# Patient Record
Sex: MALE | Race: WHITE | NOT HISPANIC OR LATINO | URBAN - METROPOLITAN AREA
[De-identification: names, ages, dates, MRNs, and addresses within clinical notes are randomized per-mention and may not be internally consistent; named-entity substitution may affect disease eponyms.]

---

## 2020-02-03 ENCOUNTER — INPATIENT (INPATIENT)
Facility: HOSPITAL | Age: 70
LOS: 1 days | Discharge: HOME | DRG: 872 | End: 2020-02-05
Admitting: INTERNAL MEDICINE
Payer: MEDICARE

## 2020-02-03 VITALS
HEART RATE: 90 BPM | WEIGHT: 160.06 LBS | HEIGHT: 67 IN | TEMPERATURE: 101 F | RESPIRATION RATE: 18 BRPM | SYSTOLIC BLOOD PRESSURE: 133 MMHG | DIASTOLIC BLOOD PRESSURE: 84 MMHG | OXYGEN SATURATION: 98 %

## 2020-02-03 DIAGNOSIS — M54.9 DORSALGIA, UNSPECIFIED: ICD-10-CM

## 2020-02-03 DIAGNOSIS — Z29.9 ENCOUNTER FOR PROPHYLACTIC MEASURES, UNSPECIFIED: ICD-10-CM

## 2020-02-03 DIAGNOSIS — D72.820 LYMPHOCYTOSIS (SYMPTOMATIC): ICD-10-CM

## 2020-02-03 DIAGNOSIS — I10 ESSENTIAL (PRIMARY) HYPERTENSION: ICD-10-CM

## 2020-02-03 DIAGNOSIS — A41.9 SEPSIS, UNSPECIFIED ORGANISM: ICD-10-CM

## 2020-02-03 DIAGNOSIS — C44.92 SQUAMOUS CELL CARCINOMA OF SKIN, UNSPECIFIED: ICD-10-CM

## 2020-02-03 DIAGNOSIS — G89.29 OTHER CHRONIC PAIN: ICD-10-CM

## 2020-02-03 DIAGNOSIS — E86.0 DEHYDRATION: ICD-10-CM

## 2020-02-03 DIAGNOSIS — N12 TUBULO-INTERSTITIAL NEPHRITIS, NOT SPECIFIED AS ACUTE OR CHRONIC: ICD-10-CM

## 2020-02-03 DIAGNOSIS — Z88.0 ALLERGY STATUS TO PENICILLIN: ICD-10-CM

## 2020-02-03 DIAGNOSIS — A41.51 SEPSIS DUE TO ESCHERICHIA COLI [E. COLI]: ICD-10-CM

## 2020-02-03 DIAGNOSIS — Z91.89 OTHER SPECIFIED PERSONAL RISK FACTORS, NOT ELSEWHERE CLASSIFIED: ICD-10-CM

## 2020-02-03 DIAGNOSIS — Z87.891 PERSONAL HISTORY OF NICOTINE DEPENDENCE: ICD-10-CM

## 2020-02-03 DIAGNOSIS — N28.89 OTHER SPECIFIED DISORDERS OF KIDNEY AND URETER: ICD-10-CM

## 2020-02-03 DIAGNOSIS — N17.9 ACUTE KIDNEY FAILURE, UNSPECIFIED: ICD-10-CM

## 2020-02-03 LAB
ALBUMIN SERPL ELPH-MCNC: 3.9 G/DL — SIGNIFICANT CHANGE UP (ref 3.3–5)
ALP SERPL-CCNC: 77 U/L — SIGNIFICANT CHANGE UP (ref 40–120)
ALT FLD-CCNC: 46 U/L — HIGH (ref 10–45)
ANION GAP SERPL CALC-SCNC: 15 MMOL/L — SIGNIFICANT CHANGE UP (ref 5–17)
APPEARANCE UR: CLEAR — SIGNIFICANT CHANGE UP
AST SERPL-CCNC: 27 U/L — SIGNIFICANT CHANGE UP (ref 10–40)
BASOPHILS # BLD AUTO: 0 K/UL — SIGNIFICANT CHANGE UP (ref 0–0.2)
BASOPHILS NFR BLD AUTO: 0 % — SIGNIFICANT CHANGE UP (ref 0–2)
BILIRUB SERPL-MCNC: 0.9 MG/DL — SIGNIFICANT CHANGE UP (ref 0.2–1.2)
BILIRUB UR-MCNC: NEGATIVE — SIGNIFICANT CHANGE UP
BUN SERPL-MCNC: 15 MG/DL — SIGNIFICANT CHANGE UP (ref 7–23)
CALCIUM SERPL-MCNC: 9.4 MG/DL — SIGNIFICANT CHANGE UP (ref 8.4–10.5)
CHLORIDE SERPL-SCNC: 97 MMOL/L — SIGNIFICANT CHANGE UP (ref 96–108)
CO2 SERPL-SCNC: 26 MMOL/L — SIGNIFICANT CHANGE UP (ref 22–31)
COLOR SPEC: YELLOW — SIGNIFICANT CHANGE UP
CREAT SERPL-MCNC: 1.28 MG/DL — SIGNIFICANT CHANGE UP (ref 0.5–1.3)
DIFF PNL FLD: ABNORMAL
EOSINOPHIL # BLD AUTO: 0 K/UL — SIGNIFICANT CHANGE UP (ref 0–0.5)
EOSINOPHIL NFR BLD AUTO: 0 % — SIGNIFICANT CHANGE UP (ref 0–6)
FLU A RESULT: SIGNIFICANT CHANGE UP
FLU A RESULT: SIGNIFICANT CHANGE UP
FLUAV AG NPH QL: SIGNIFICANT CHANGE UP
FLUBV AG NPH QL: SIGNIFICANT CHANGE UP
GLUCOSE BLDC GLUCOMTR-MCNC: 109 MG/DL — HIGH (ref 70–99)
GLUCOSE SERPL-MCNC: 144 MG/DL — HIGH (ref 70–99)
GLUCOSE UR QL: NEGATIVE — SIGNIFICANT CHANGE UP
HCT VFR BLD CALC: 45.3 % — SIGNIFICANT CHANGE UP (ref 39–50)
HGB BLD-MCNC: 15 G/DL — SIGNIFICANT CHANGE UP (ref 13–17)
KETONES UR-MCNC: NEGATIVE — SIGNIFICANT CHANGE UP
LACTATE SERPL-SCNC: 1.5 MMOL/L — SIGNIFICANT CHANGE UP (ref 0.5–2)
LEUKOCYTE ESTERASE UR-ACNC: ABNORMAL
LYMPHOCYTES # BLD AUTO: 1.58 K/UL — SIGNIFICANT CHANGE UP (ref 1–3.3)
LYMPHOCYTES # BLD AUTO: 6.1 % — LOW (ref 13–44)
MCHC RBC-ENTMCNC: 29.1 PG — SIGNIFICANT CHANGE UP (ref 27–34)
MCHC RBC-ENTMCNC: 33.1 GM/DL — SIGNIFICANT CHANGE UP (ref 32–36)
MCV RBC AUTO: 88 FL — SIGNIFICANT CHANGE UP (ref 80–100)
MONOCYTES # BLD AUTO: 0.9 K/UL — SIGNIFICANT CHANGE UP (ref 0–0.9)
MONOCYTES NFR BLD AUTO: 3.5 % — SIGNIFICANT CHANGE UP (ref 2–14)
NEUTROPHILS # BLD AUTO: 22.92 K/UL — HIGH (ref 1.8–7.4)
NEUTROPHILS NFR BLD AUTO: 88.7 % — HIGH (ref 43–77)
NITRITE UR-MCNC: POSITIVE
PH UR: 7 — SIGNIFICANT CHANGE UP (ref 5–8)
PLATELET # BLD AUTO: 349 K/UL — SIGNIFICANT CHANGE UP (ref 150–400)
POTASSIUM SERPL-MCNC: 4.5 MMOL/L — SIGNIFICANT CHANGE UP (ref 3.5–5.3)
POTASSIUM SERPL-SCNC: 4.5 MMOL/L — SIGNIFICANT CHANGE UP (ref 3.5–5.3)
PROT SERPL-MCNC: 7.3 G/DL — SIGNIFICANT CHANGE UP (ref 6–8.3)
PROT UR-MCNC: 30 MG/DL
RBC # BLD: 5.15 M/UL — SIGNIFICANT CHANGE UP (ref 4.2–5.8)
RBC # FLD: 13.8 % — SIGNIFICANT CHANGE UP (ref 10.3–14.5)
RSV RESULT: SIGNIFICANT CHANGE UP
RSV RNA RESP QL NAA+PROBE: SIGNIFICANT CHANGE UP
SODIUM SERPL-SCNC: 138 MMOL/L — SIGNIFICANT CHANGE UP (ref 135–145)
SP GR SPEC: 1.01 — SIGNIFICANT CHANGE UP (ref 1–1.03)
UROBILINOGEN FLD QL: 0.2 E.U./DL — SIGNIFICANT CHANGE UP
WBC # BLD: 25.84 K/UL — HIGH (ref 3.8–10.5)
WBC # FLD AUTO: 25.84 K/UL — HIGH (ref 3.8–10.5)

## 2020-02-03 PROCEDURE — 71046 X-RAY EXAM CHEST 2 VIEWS: CPT | Mod: 26

## 2020-02-03 PROCEDURE — 99223 1ST HOSP IP/OBS HIGH 75: CPT | Mod: GC

## 2020-02-03 PROCEDURE — 70450 CT HEAD/BRAIN W/O DYE: CPT | Mod: 26

## 2020-02-03 PROCEDURE — 71260 CT THORAX DX C+: CPT | Mod: 26

## 2020-02-03 PROCEDURE — 99291 CRITICAL CARE FIRST HOUR: CPT

## 2020-02-03 PROCEDURE — 74177 CT ABD & PELVIS W/CONTRAST: CPT | Mod: 26

## 2020-02-03 RX ORDER — SODIUM CHLORIDE 9 MG/ML
1000 INJECTION, SOLUTION INTRAVENOUS
Refills: 0 | Status: DISCONTINUED | OUTPATIENT
Start: 2020-02-03 | End: 2020-02-05

## 2020-02-03 RX ORDER — SODIUM CHLORIDE 9 MG/ML
1000 INJECTION INTRAMUSCULAR; INTRAVENOUS; SUBCUTANEOUS ONCE
Refills: 0 | Status: COMPLETED | OUTPATIENT
Start: 2020-02-03 | End: 2020-02-03

## 2020-02-03 RX ORDER — SODIUM CHLORIDE 9 MG/ML
200 INJECTION INTRAMUSCULAR; INTRAVENOUS; SUBCUTANEOUS ONCE
Refills: 0 | Status: COMPLETED | OUTPATIENT
Start: 2020-02-03 | End: 2020-02-03

## 2020-02-03 RX ORDER — ACETAMINOPHEN 500 MG
650 TABLET ORAL EVERY 4 HOURS
Refills: 0 | Status: DISCONTINUED | OUTPATIENT
Start: 2020-02-03 | End: 2020-02-03

## 2020-02-03 RX ORDER — DEXTROSE 50 % IN WATER 50 %
25 SYRINGE (ML) INTRAVENOUS ONCE
Refills: 0 | Status: DISCONTINUED | OUTPATIENT
Start: 2020-02-03 | End: 2020-02-05

## 2020-02-03 RX ORDER — ACETAMINOPHEN 500 MG
650 TABLET ORAL EVERY 4 HOURS
Refills: 0 | Status: DISCONTINUED | OUTPATIENT
Start: 2020-02-03 | End: 2020-02-05

## 2020-02-03 RX ORDER — VANCOMYCIN HCL 1 G
1000 VIAL (EA) INTRAVENOUS ONCE
Refills: 0 | Status: COMPLETED | OUTPATIENT
Start: 2020-02-03 | End: 2020-02-03

## 2020-02-03 RX ORDER — CEFTRIAXONE 500 MG/1
2000 INJECTION, POWDER, FOR SOLUTION INTRAMUSCULAR; INTRAVENOUS EVERY 24 HOURS
Refills: 0 | Status: DISCONTINUED | OUTPATIENT
Start: 2020-02-03 | End: 2020-02-05

## 2020-02-03 RX ORDER — CEFEPIME 1 G/1
2000 INJECTION, POWDER, FOR SOLUTION INTRAMUSCULAR; INTRAVENOUS ONCE
Refills: 0 | Status: COMPLETED | OUTPATIENT
Start: 2020-02-03 | End: 2020-02-03

## 2020-02-03 RX ORDER — DEXTROSE 50 % IN WATER 50 %
15 SYRINGE (ML) INTRAVENOUS ONCE
Refills: 0 | Status: DISCONTINUED | OUTPATIENT
Start: 2020-02-03 | End: 2020-02-05

## 2020-02-03 RX ORDER — INSULIN LISPRO 100/ML
VIAL (ML) SUBCUTANEOUS
Refills: 0 | Status: DISCONTINUED | OUTPATIENT
Start: 2020-02-03 | End: 2020-02-05

## 2020-02-03 RX ORDER — KETOROLAC TROMETHAMINE 30 MG/ML
15 SYRINGE (ML) INJECTION ONCE
Refills: 0 | Status: DISCONTINUED | OUTPATIENT
Start: 2020-02-03 | End: 2020-02-03

## 2020-02-03 RX ORDER — DEXTROSE 50 % IN WATER 50 %
12.5 SYRINGE (ML) INTRAVENOUS ONCE
Refills: 0 | Status: DISCONTINUED | OUTPATIENT
Start: 2020-02-03 | End: 2020-02-05

## 2020-02-03 RX ORDER — ACETAMINOPHEN 500 MG
1000 TABLET ORAL ONCE
Refills: 0 | Status: COMPLETED | OUTPATIENT
Start: 2020-02-03 | End: 2020-02-03

## 2020-02-03 RX ORDER — ENOXAPARIN SODIUM 100 MG/ML
40 INJECTION SUBCUTANEOUS EVERY 24 HOURS
Refills: 0 | Status: DISCONTINUED | OUTPATIENT
Start: 2020-02-03 | End: 2020-02-05

## 2020-02-03 RX ORDER — CARVEDILOL PHOSPHATE 80 MG/1
1 CAPSULE, EXTENDED RELEASE ORAL
Qty: 0 | Refills: 0 | DISCHARGE

## 2020-02-03 RX ORDER — NIFEDIPINE 30 MG
20 TABLET, EXTENDED RELEASE 24 HR ORAL
Qty: 0 | Refills: 0 | DISCHARGE

## 2020-02-03 RX ORDER — GLUCAGON INJECTION, SOLUTION 0.5 MG/.1ML
1 INJECTION, SOLUTION SUBCUTANEOUS ONCE
Refills: 0 | Status: DISCONTINUED | OUTPATIENT
Start: 2020-02-03 | End: 2020-02-05

## 2020-02-03 RX ORDER — ACETAMINOPHEN 500 MG
975 TABLET ORAL ONCE
Refills: 0 | Status: COMPLETED | OUTPATIENT
Start: 2020-02-03 | End: 2020-02-03

## 2020-02-03 RX ORDER — SODIUM CHLORIDE 9 MG/ML
1000 INJECTION INTRAMUSCULAR; INTRAVENOUS; SUBCUTANEOUS
Refills: 0 | Status: DISCONTINUED | OUTPATIENT
Start: 2020-02-03 | End: 2020-02-04

## 2020-02-03 RX ORDER — ONDANSETRON 8 MG/1
4 TABLET, FILM COATED ORAL ONCE
Refills: 0 | Status: COMPLETED | OUTPATIENT
Start: 2020-02-03 | End: 2020-02-03

## 2020-02-03 RX ADMIN — Medication 250 MILLIGRAM(S): at 15:01

## 2020-02-03 RX ADMIN — Medication 975 MILLIGRAM(S): at 14:09

## 2020-02-03 RX ADMIN — CEFEPIME 100 MILLIGRAM(S): 1 INJECTION, POWDER, FOR SOLUTION INTRAMUSCULAR; INTRAVENOUS at 14:45

## 2020-02-03 RX ADMIN — Medication 15 MILLIGRAM(S): at 16:22

## 2020-02-03 RX ADMIN — Medication 400 MILLIGRAM(S): at 14:45

## 2020-02-03 RX ADMIN — SODIUM CHLORIDE 1000 MILLILITER(S): 9 INJECTION INTRAMUSCULAR; INTRAVENOUS; SUBCUTANEOUS at 14:45

## 2020-02-03 RX ADMIN — Medication 975 MILLIGRAM(S): at 14:50

## 2020-02-03 RX ADMIN — Medication 1000 MILLIGRAM(S): at 15:08

## 2020-02-03 RX ADMIN — CEFEPIME 2000 MILLIGRAM(S): 1 INJECTION, POWDER, FOR SOLUTION INTRAMUSCULAR; INTRAVENOUS at 15:08

## 2020-02-03 RX ADMIN — SODIUM CHLORIDE 100 MILLILITER(S): 9 INJECTION INTRAMUSCULAR; INTRAVENOUS; SUBCUTANEOUS at 20:04

## 2020-02-03 RX ADMIN — SODIUM CHLORIDE 1000 MILLILITER(S): 9 INJECTION INTRAMUSCULAR; INTRAVENOUS; SUBCUTANEOUS at 14:09

## 2020-02-03 RX ADMIN — ENOXAPARIN SODIUM 40 MILLIGRAM(S): 100 INJECTION SUBCUTANEOUS at 21:58

## 2020-02-03 RX ADMIN — Medication 1000 MILLIGRAM(S): at 15:00

## 2020-02-03 RX ADMIN — SODIUM CHLORIDE 400 MILLILITER(S): 9 INJECTION INTRAMUSCULAR; INTRAVENOUS; SUBCUTANEOUS at 15:01

## 2020-02-03 RX ADMIN — CEFTRIAXONE 100 MILLIGRAM(S): 500 INJECTION, POWDER, FOR SOLUTION INTRAMUSCULAR; INTRAVENOUS at 19:53

## 2020-02-03 RX ADMIN — Medication 15 MILLIGRAM(S): at 15:37

## 2020-02-03 NOTE — H&P ADULT - PROBLEM SELECTOR PLAN 1
-PCP Contacted on Admission: (Y/N) -->  Dr. Gelacio Melgar  -Date of Contact with PCP:  -PCP Contacted at Discharge: (Y/N, N/A)  -Summary of Handoff Given to PCP:   -Post-Discharge Appointment Date and Location: Pt Septic at presentation (89 F, tachy, wbc, u/a +) 2/2 pyelonephritis 2/2 UTI. Pt p/w chronic back pain + fevers (103.5 F at home) for weeks. 89.F rectal in ED, leukocytosis 25.84, WBC 25.84, elevated neutrophils, U/A=+protein, +nitrite, +leuk esterase, large blood, >10 wbc. CT abdomen showed right ureteritis/UTI concerning for acute pyelonephritis (no stones or obstruction seen). Currently normal creatinine. S/p 2.2 L .9NS. Currently afebrile w/ VSS, perfusing well to organs + mentating well  -c/w 2g ceftriaxone  -started maintenance fluids 100cc/hr .9NS  -ordered US retroperitoneal   -urology consulted + following, defer ureteral stent/PCN at this time as pt improving w/ VSS  - Follow up BCx  - Follow up UCx Pt Septic at presentation (89 F, tachy, wbc, u/a +) 2/2 pyelonephritis 2/2 UTI. Pt p/w chronic back pain + fevers (103.5 F at home) for weeks. 106.F rectal in ED, leukocytosis 25.84, elevated neutrophils, U/A=+protein, +nitrite, +leuk esterase, large blood, >10 wbc. CT abdomen showed right ureteritis/UTI concerning for acute pyelonephritis (no stones or obstruction seen). Currently normal creatinine. S/p 2.2 L .9NS. Currently afebrile w/ VSS, perfusing well to organs + mentating well  -c/w 2g ceftriaxone  -started maintenance fluids 100cc/hr .9NS  -ordered US retroperitoneal   -urology consulted + following, defer ureteral stent/PCN at this time as pt improving w/ VSS  - Follow up BCx  - Follow up UCx

## 2020-02-03 NOTE — CONSULT NOTE ADULT - SUBJECTIVE AND OBJECTIVE BOX
69M hx chronic back pain, former smoker, no significant  hx including stones, hematuria, dysuria presented to ER today with malaise, mental status changes and was found to have fever of 106 with CT findings c/w right pyelonephritis and ureterities with mild hydronephrosis. A week PTA he notes a viral illness which resolved last week but over the weekend began having malaise until this morning when he felt unwell and was febrile so he proceeded to the ER. He has noted R flank/back pain for the last few weeks but never severe and not outside his usual back pain. He denies any hx of nephrolithiasis or passing any stones, hematuria or dysuria. Former smoker in his teenage years and intermittent cigars currently. Denies dysuria but had noted some urinary urgency over the last few days.    HEALTH ISSUES - PROBLEM Dx:        PAST MEDICAL & SURGICAL HISTORY:  Chronic back pain  HTN (hypertension)    Allergies    penicillin (Other)    Intolerances      MEDICATIONS  (STANDING):  cefTRIAXone   IVPB 2000 milliGRAM(s) IV Intermittent every 24 hours  enoxaparin Injectable 40 milliGRAM(s) SubCutaneous every 24 hours    MEDICATIONS  (PRN):  acetaminophen   Tablet .. 650 milliGRAM(s) Oral every 4 hours PRN Mild Pain (1 - 3)    FAMILY HISTORY:    ICU Vital Signs Last 24 Hrs  T(C): 36.9 (2020 18:10), Max: 41.2 (2020 14:46)  T(F): 98.4 (2020 18:10), Max: 106.2 (2020 14:46)  HR: 70 (2020 18:10) (70 - 104)  BP: 118/76 (2020 18:10) (111/74 - 135/92)  BP(mean): --  ABP: --  ABP(mean): --  RR: 18 (2020 18:10) (18 - 18)  SpO2: 94% (2020 18:10) (94% - 100%)      PE:   Appears unwell, NAD, AAOx3, warm, and clamy  S/NT/ND, (-)CVAT b/l  : normal phallus, uncircumcised, prostate 50gm, no nodules, no induration  urine clear yellow, 16fr son catheter inserted without issue                          15.0   25.84 )-----------( 349      ( 2020 14:10 )             45.3     02-    138  |  97  |  15  ----------------------------<  144<H>  4.5   |  26  |  1.28    Ca    9.4      2020 14:10    TPro  7.3  /  Alb  3.9  /  TBili  0.9  /  DBili  x   /  AST  27  /  ALT  46<H>  /  AlkPhos  77  02-03      Urinalysis Basic - ( 2020 15:23 )    Color: Yellow / Appearance: Clear / S.015 / pH: x  Gluc: x / Ketone: NEGATIVE  / Bili: Negative / Urobili: 0.2 E.U./dL   Blood: x / Protein: 30 mg/dL / Nitrite: POSITIVE   Leuk Esterase: Small / RBC: > 10 /HPF / WBC > 10 /HPF   Sq Epi: x / Non Sq Epi: 0-5 /HPF / Bacteria: Present /HPF    < from: CT Abdomen and Pelvis w/ IV Cont (20 @ 16:07) >  Impression:   1.  Negative for consolidation of the lung.  2.  No evidence of bowel obstruction or free air.  3.  Findings in reference to the right ureter suggests acute ureteritis/UTI. Findings in reference to the right kidney suggest acute pyelonephritis. Correlate with urine culture and microscopy.    < end of copied text >

## 2020-02-03 NOTE — CONSULT NOTE ADULT - SUBJECTIVE AND OBJECTIVE BOX
Patient is a 69y old  Male who presents with a chief complaint of Pyelonephritis (2020 19:47)      HPI:  incomplete (2020 19:47)      PAST MEDICAL & SURGICAL HISTORY:  Chronic back pain  HTN (hypertension)      FAMILY HISTORY:      SOCIAL HISTORY:  Smoking Status: [ ] Current, [ ] Former, [ ] Never  Pack Years:    MEDICATIONS:  Pulmonary:    Antimicrobials:  cefTRIAXone   IVPB 2000 milliGRAM(s) IV Intermittent every 24 hours    Anticoagulants:  enoxaparin Injectable 40 milliGRAM(s) SubCutaneous every 24 hours    Onc:    GI/:    Endocrine:  dextrose 40% Gel 15 Gram(s) Oral once PRN  dextrose 50% Injectable 12.5 Gram(s) IV Push once  dextrose 50% Injectable 25 Gram(s) IV Push once  dextrose 50% Injectable 25 Gram(s) IV Push once  glucagon  Injectable 1 milliGRAM(s) IntraMuscular once PRN  insulin lispro (HumaLOG) corrective regimen sliding scale   SubCutaneous Before meals and at bedtime    Cardiac:    Other Medications:  acetaminophen   Tablet .. 650 milliGRAM(s) Oral every 4 hours PRN  dextrose 5%. 1000 milliLiter(s) IV Continuous <Continuous>  sodium chloride 0.9%. 1000 milliLiter(s) IV Continuous <Continuous>      Allergies    penicillin (Other)    Intolerances        Review of Systems:   •	General: fevre weak  •	Skin/Breast: negative  •	Ophthalmologic: negative  •	ENMT: negative  •	Respiratory and Thorax: negative  •	Cardiovascular: negative  •	Gastrointestinal: negative  •	Genitourinary: negative  •	Musculoskeletal: negative  •	Neurological: negative  •	Psychiatric: negative  •	Hematology/Lymphatics: negative  •	Endocrine: negative  •	Allergic/Immunologic: negative    Physical Exam:   • Constitutional:	Well-developed, well nourished  • Eyes:	EOMI; PERRL; no drainage or redness  • ENMT:	No oral lesions; no gross abnormalities  • Neck	No bruits; no thyromegaly or nodules  • Breasts:	not examined  • Back:	No deformity or limitation of movement  • Respiratory:	Breath Sounds equal & clear to percussion & auscultation, no accessory muscle use  • Cardiovascular:	Regular rate & rhythm, normal S1, S2; no murmurs, gallops or rubs; no S3, S4  • Gastrointestinal:	Soft, non-tender, no hepatosplenomegaly, normal bowel sounds  • Genitourinary:	not examined  • Rectal: not examined  • Extremities:	No cyanosis, clubbing or edema  • Vascular:	Equal and normal pulses (carotid, femoral, dorsalis pedis)  • Neurologica:l	not examined  • Skin:	No lesions; no rash  • Lymph Nodes:	No lymphadedenopathy  • Musculoskeletal:	No joint pain, swelling or deformity; no limitation of movement      Vital Signs Last 24 Hrs  T(C): 36.9 (2020 18:10), Max: 41.2 (2020 14:46)  T(F): 98.4 (2020 18:10), Max: 106.2 (2020 14:46)  HR: 70 (2020 18:10) (70 - 104)  BP: 118/76 (2020 18:10) (111/74 - 135/92)  BP(mean): --  RR: 18 (2020 18:10) (18 - 18)  SpO2: 94% (2020 18:10) (94% - 100%)     @ 07:01  -  - @ 21:40  --------------------------------------------------------  IN: 0 mL / OUT: 300 mL / NET: -300 mL          LABS:      CBC Full  -  ( 2020 14:10 )  WBC Count : 25.84 K/uL  RBC Count : 5.15 M/uL  Hemoglobin : 15.0 g/dL  Hematocrit : 45.3 %  Platelet Count - Automated : 349 K/uL  Mean Cell Volume : 88.0 fl  Mean Cell Hemoglobin : 29.1 pg  Mean Cell Hemoglobin Concentration : 33.1 gm/dL  Auto Neutrophil # : 22.92 K/uL  Auto Lymphocyte # : 1.58 K/uL  Auto Monocyte # : 0.90 K/uL  Auto Eosinophil # : 0.00 K/uL  Auto Basophil # : 0.00 K/uL  Auto Neutrophil % : 88.7 %  Auto Lymphocyte % : 6.1 %  Auto Monocyte % : 3.5 %  Auto Eosinophil % : 0.0 %  Auto Basophil % : 0.0 %        138  |  97  |  15  ----------------------------<  144<H>  4.5   |  26  |  1.28    Ca    9.4      2020 14:10    TPro  7.3  /  Alb  3.9  /  TBili  0.9  /  DBili  x   /  AST  27  /  ALT  46<H>  /  AlkPhos  77  -          Urinalysis Basic - ( 2020 15:23 )    Color: Yellow / Appearance: Clear / S.015 / pH: x  Gluc: x / Ketone: NEGATIVE  / Bili: Negative / Urobili: 0.2 E.U./dL   Blood: x / Protein: 30 mg/dL / Nitrite: POSITIVE   Leuk Esterase: Small / RBC: > 10 /HPF / WBC > 10 /HPF   Sq Epi: x / Non Sq Epi: 0-5 /HPF / Bacteria: Present /HPF                RADIOLOGY & ADDITIONAL STUDIES (The following images were personally reviewed):    < from: CT Chest w/ IV Cont (20 @ 16:07) >    EXAM:  CT CHEST IC                          EXAM:  CT ABDOMEN AND PELVIS IC                          PROCEDURE DATE:  2020          INTERPRETATION:  CT SCAN OF CHEST, ABDOMEN AND PELVIS    History: Fever, vomiting, leukocytosis. Rule out pneumonia.    Technique: CT scan of chest, abdomen and pelvis performed from lung apices through pubic symphysis. Intravenous contrast was administered. No oral contrast was given as per ordering physician. Axial, coronal, and sagittal multiplanar reformatted images were produced. Thin section axial images and axial MIPS of the chest were also produced.     Comparison: None.    Findings:     Lungs and large airways: Minimal bibasilar atelectasis bilaterally. Subtle groundglass nodular opacities in theleft upper lobe.    Pleura:  No pleural effusion.    Mediastinum and hilar regions: No thoracic lymphadenopathy.    Heart and pericardium:  Heart size is normal. No pericardial effusion.    Vessels:  There is scant calcified plaque of the cortical iliac aortoiliac system.    Chest wall and lower neck:  Normal.    Liver:  Normal.    Gallbladder: No radiopaque stones gallbladder.    Spleen:  Mildly enlarged.    Pancreas:  Normal.    Adrenal glands:  Normal.    Kidneys: 1.3 cm hypoattenuating lesionof the interpolar region of the left kidney, consistent with a cyst. Subtle cortical hypodensities in the right kidney. The right ureter is mildly dilated and shows wall thickening and periureteric stranding. No ureteric calculus. Right perinephric fat infiltration. This appears to be slight delay in excretion by the right kidney as compared to the left.    Abdominal and pelvic adenopathy:  No lymphadenopathy in abdomen or pelvis.    Ascites: None.    Gastrointestinal tract: Evaluation limited, secondary to lack of oral contrast. There is a radiopaque foreign body present within the rectum, likely representing external temperature probe Normal appendix. No evidence of obstruction or free air. Normal appendix.    Pelvic organs: There is mild prostatomegaly, with a calculated volume of 57 cc. The prostate measures 5.5 x 4.3 x 4.5 cm. The urinary bladder appears unremarkable.    Soft tissues: There is a tiny fat-containing umbilical hernia.    Bones: Osteophyte formation present at the endplate of T9-T10.      Impression:   1.  Negative for consolidation of the lung.  2.  No evidence of bowel obstruction or free air.  3.  Findings in reference to the right ureter suggests acute ureteritis/UTI. Findings in reference to the right kidney suggest acute pyelonephritis. Correlate with urine culture and microscopy.    < end of copied text >

## 2020-02-03 NOTE — H&P ADULT - NSHPPHYSICALEXAM_GEN_ALL_CORE
T(F): 98.4 (02-03-20 @ 18:10)  HR: 66 (02-03-20 @ 23:15)  BP: 136/81 (02-03-20 @ 23:15)  RR: 17 (02-03-20 @ 23:15)  SpO2: 98% (02-03-20 @ 23:15)    GENERAL: NAD, breathing comfortably on room air  HEAD:  Atraumatic, Normocephalic  EYES: EOMI, PERRLA, conjunctiva and sclera clear  ENT: dry mucous membranes  NECK: Supple   CHEST/LUNG: Clear to auscultation bilaterally; No rales, rhonchi, wheezing, or rubs. Unlabored respirations  HEART: Regular rate and rhythm; No murmurs, rubs, or gallops  ABDOMEN: Bowel sounds present; Soft, Nontender, Nondistended   EXTREMITIES:  2+ Peripheral radial + DP. No leg edema, warm hands + feet  NERVOUS SYSTEM:  Alert & Oriented X3, speech clear. No deficits   MSK: FROM all 4 extremities, full and equal strength. Positive CVA  tenderness in Right lower back  SKIN: No rashes or lesions

## 2020-02-03 NOTE — H&P ADULT - NSHPREVIEWOFSYSTEMS_GEN_ALL_CORE
REVIEW OF SYSTEMS:    CONSTITUTIONAL: No weakness, chills. Admits to fevers  EYES/ENT: No visual changes;  No vertigo or throat pain   NECK: No pain or stiffness  RESPIRATORY: No cough, wheezing, hemoptysis; No shortness of breath  CARDIOVASCULAR: No chest pain or palpitations  GASTROINTESTINAL: No abdominal or epigastric pain. No nausea, vomiting, or hematemesis; No diarrhea or constipation. No melena or hematochezia.  MSK: Admits to chronic back pain  GENITOURINARY: No dysuria or hematuria. Admits to increased urgency/discomfort w/ urination   NEUROLOGICAL: No numbness or weakness  SKIN: No itching, rashes

## 2020-02-03 NOTE — PROGRESS NOTE ADULT - NSHPATTENDINGPLANDISCUSS_GEN_ALL_CORE
ER Physician and PA earlier as well as Critical care attending once results of the CT scans were available, as well the patient and his daughter Nina and his brother  Gelacio Palm this evening. Will follow patient in hospital and later as outpatient.

## 2020-02-03 NOTE — H&P ADULT - PROBLEM SELECTOR PLAN 3
Pt w/ hx of HTN, takes coreg 12.5mg qd and Nifedipine 20mg ER qhs at home. Currently 130's/80's  -hold bp meds in setting of sepsis

## 2020-02-03 NOTE — CONSULT NOTE ADULT - ASSESSMENT
69M hx HTN, no significant  hx, with likely severe pyelonephritis, mild hydronephrosis but no obstructing stone or lesion visible on CT.  -UCx  -Broad spectrum ABx  -Renal/Bladder US  -Son  -It is early in his clinical course but currently is afebrile with VSS. Would defer ureteral stent/PCN at this time given no significant hydronephrosis and clinically improving, will consider if clinically deteriorating  -Will follow closely with you

## 2020-02-03 NOTE — H&P ADULT - ASSESSMENT
68 yo M PMH HTN, prostatitis, chronic back pain (2/2 MSK issues), chronic lymphocytosis, skin SCC w/ resection/biopsy p/w fever + urinary discomfort for the last 3 day, found to have sepsis (89 F, tachy, wbc, u/a +) 2/2 pyelonephritis (CT abdomen showed right ureteritis/UTI concerning for acute pyelonephritis (no stones or obstruction seen)) 2/2 UTI (U/A=+protein, +nitrite, +leuk esterase, large blood, >10 wbc)

## 2020-02-03 NOTE — CONSULT NOTE ADULT - ATTENDING COMMENTS
Patient seen and examined with house-staff during bedside rounds.  Resident note read, including vitals, physical findings, laboratory data, and radiological reports.   Revisions included below.  Direct personal management at bed side and extensive interpretation of the data.  Plan was outlined and discussed in details with the housestaff.  Decision making of high complexity  Action taken for acute disease activity to reflect the level of care provided:  - medication reconciliation  - review laboratory data  PMD and urology

## 2020-02-03 NOTE — CONSULT NOTE ADULT - SUBJECTIVE AND OBJECTIVE BOX
CONSULT NOTE:     This is a 69 y.o male w/ HTN who presented from home after having a fever of 103. Per patient, 2 weeks ago, he had a dry cough for which he took a Z pack without improvement. He prescribed himself medrol dose pack ( last dose Sat) after which his cough resolved. Since , he has been feeling extremely weak. He denies any back pain, abdominal pain, N/V/D/C but ROS positive for urinary urgency, fevers and chills. Per family at bedside, he had a brief episode of altered mental status at the time of fever that resolved.   On ED arrival, his VS were notable for rectal Temp of 106, 's. He was given 2.2 L NS, vancomycin and cefepime.   His labs were notable for WC 25k with neutrophil predominance, positive UA, CXR with no consolidation. RVP negative.   CT abdomen and pelvis showed findings suggestive of R  ureteritis and pyelonephritis.     VITAL SIGNS:  Vital Signs Last 24 Hrs  T(C): 36.8 (2020 17:41), Max: 41.2 (2020 14:46)  T(F): 98.2 (2020 17:41), Max: 106.2 (2020 14:46)  HR: 96 (2020 17:41) (90 - 104)  BP: 111/74 (2020 17:41) (111/74 - 135/92)  BP(mean): --  RR: 18 (2020 17:41) (18 - 18)  SpO2: 96% (2020 17:41) (96% - 100%)      PHYSICAL EXAM:    General: WDWN male in NAD  HEENT: NC/AT; PERRL, anicteric sclera; MMM  Neck: supple  Cardiovascular: +S1/S2; RRR, tachycardic   Respiratory: CTA B/L; no W/R/R  Gastrointestinal: soft, NT/ND; +BSx4. No CVA tenderness  Extremities: WWP; no edema, clubbing or cyanosis  Vascular: 2+ radial, DP/PT pulses B/L  Neurological: AAOx3; no focal deficits    MEDICATIONS:  MEDICATIONS  (STANDING):    MEDICATIONS  (PRN):      ALLERGIES:  Allergies    penicillin (Other)    Intolerances        LABS:                        15.0   25.84 )-----------( 349      ( 2020 14:10 )             45.3     02-    138  |  97  |  15  ----------------------------<  144<H>  4.5   |  26  |  1.28    Ca    9.4      2020 14:10    TPro  7.3  /  Alb  3.9  /  TBili  0.9  /  DBili  x   /  AST  27  /  ALT  46<H>  /  AlkPhos  77  02-03      Urinalysis Basic - ( 2020 15:23 )    Color: Yellow / Appearance: Clear / S.015 / pH: x  Gluc: x / Ketone: NEGATIVE  / Bili: Negative / Urobili: 0.2 E.U./dL   Blood: x / Protein: 30 mg/dL / Nitrite: POSITIVE   Leuk Esterase: Small / RBC: > 10 /HPF / WBC > 10 /HPF   Sq Epi: x / Non Sq Epi: 0-5 /HPF / Bacteria: Present /HPF      CAPILLARY BLOOD GLUCOSE            < from: CT Abdomen and Pelvis w/ IV Cont (20 @ 16:07) >  Impression:   1.  Negative for consolidation of the lung.  2.  No evidence of bowel obstruction or free air.  3.  Findings in reference to the right ureter suggests acute ureteritis/UTI. Findings in reference to the right kidney suggest acute pyelonephritis. Correlate with urine culture and microscopy.        < end of copied text > CONSULT NOTE:     This is a 69 y.o male w/ HTN who presented from home after having a fever of 103. Per patient, 2 weeks ago, he had a dry cough for which he took a Z pack without improvement. He prescribed himself medrol dose pack ( last dose Sat) after which his cough resolved. Since , he has been feeling extremely weak. He denies any back pain, abdominal pain, N/V/D/C but ROS positive for urinary urgency, fevers and chills. Per family at bedside, he had a brief episode of altered mental status at the time of fever that resolved.   On ED arrival, his VS were notable for rectal Temp of 106, 's. He was given 2.2 L NS, vancomycin and cefepime.   His labs were notable for WC 25k with neutrophil predominance, positive UA, CXR with no consolidation. RVP negative.   CT abdomen and pelvis showed findings suggestive of R  ureteritis and pyelonephritis.     MEDS:   Nifedipine    Social Hx:   Works as M physician in a private concert     VITAL SIGNS:  Vital Signs Last 24 Hrs  T(C): 36.8 (2020 17:41), Max: 41.2 (2020 14:46)  T(F): 98.2 (2020 17:41), Max: 106.2 (2020 14:46)  HR: 96 (2020 17:41) (90 - 104)  BP: 111/74 (2020 17:41) (111/74 - 135/92)  BP(mean): --  RR: 18 (2020 17:41) (18 - 18)  SpO2: 96% (2020 17:41) (96% - 100%)      PHYSICAL EXAM:    General: WDWN male in NAD  HEENT: NC/AT; PERRL, anicteric sclera; MMM  Neck: supple  Cardiovascular: +S1/S2; RRR, tachycardic   Respiratory: CTA B/L; no W/R/R  Gastrointestinal: soft, NT/ND; +BSx4. No CVA tenderness  Extremities: WWP; no edema, clubbing or cyanosis  Vascular: 2+ radial, DP/PT pulses B/L  Neurological: AAOx3; no focal deficits    MEDICATIONS:  MEDICATIONS  (STANDING):    MEDICATIONS  (PRN):      ALLERGIES:  Allergies    penicillin (Other)    Intolerances        LABS:                        15.0   25.84 )-----------( 349      ( 2020 14:10 )             45.3     02-    138  |  97  |  15  ----------------------------<  144<H>  4.5   |  26  |  1.28    Ca    9.4      2020 14:10    TPro  7.3  /  Alb  3.9  /  TBili  0.9  /  DBili  x   /  AST  27  /  ALT  46<H>  /  AlkPhos  77  02-03      Urinalysis Basic - ( 2020 15:23 )    Color: Yellow / Appearance: Clear / S.015 / pH: x  Gluc: x / Ketone: NEGATIVE  / Bili: Negative / Urobili: 0.2 E.U./dL   Blood: x / Protein: 30 mg/dL / Nitrite: POSITIVE   Leuk Esterase: Small / RBC: > 10 /HPF / WBC > 10 /HPF   Sq Epi: x / Non Sq Epi: 0-5 /HPF / Bacteria: Present /HPF      CAPILLARY BLOOD GLUCOSE            < from: CT Abdomen and Pelvis w/ IV Cont (20 @ 16:07) >  Impression:   1.  Negative for consolidation of the lung.  2.  No evidence of bowel obstruction or free air.  3.  Findings in reference to the right ureter suggests acute ureteritis/UTI. Findings in reference to the right kidney suggest acute pyelonephritis. Correlate with urine culture and microscopy.        < end of copied text >

## 2020-02-03 NOTE — H&P ADULT - NSHPLABSRESULTS_GEN_ALL_CORE
.  LABS:                         15.0   25.84 )-----------( 349      ( 2020 14:10 )             45.3     -    138  |  97  |  15  ----------------------------<  144<H>  4.5   |  26  |  1.28    Ca    9.4      2020 14:10    TPro  7.3  /  Alb  3.9  /  TBili  0.9  /  DBili  x   /  AST  27  /  ALT  46<H>  /  AlkPhos  77  -      Urinalysis Basic - ( 2020 15:23 )    Color: Yellow / Appearance: Clear / S.015 / pH: x  Gluc: x / Ketone: NEGATIVE  / Bili: Negative / Urobili: 0.2 E.U./dL   Blood: x / Protein: 30 mg/dL / Nitrite: POSITIVE   Leuk Esterase: Small / RBC: > 10 /HPF / WBC > 10 /HPF   Sq Epi: x / Non Sq Epi: 0-5 /HPF / Bacteria: Present /HPF            Lactate, Blood: 1.5 mmol/L ( @ 14:10)

## 2020-02-03 NOTE — ED PROVIDER NOTE - ATTENDING CONTRIBUTION TO CARE
Patient in ED w concern for fever and cough that began apx 2 weeks ago.  He notes sore throat at symptom onset.  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  His symptoms have progressed prompting his ED visit today.  On my face to face ED eval, patient is non toxic in appearance.  Tachy, reg rate.  Lungs clear.  Abd soft, NT/ND.  No rashes.  Will obtain labs, imaging, flu swabs and dispo accordingly.

## 2020-02-03 NOTE — H&P ADULT - NSHPSOCIALHISTORY_GEN_ALL_CORE
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

## 2020-02-03 NOTE — CONSULT NOTE ADULT - PROBLEM SELECTOR RECOMMENDATION 9
the patient treated himself with azithromycin and medrol dose pack last week for URI and initially improved.  CT scan picturre is consistent with pyelonephritis.  UA positive.  Pancultured and on antibiotics.  Urology saw rhe patient

## 2020-02-03 NOTE — ED ADULT NURSE NOTE - NS ED NURSE LEVEL OF CONSCIOUSNESS ORIENTATION
Interval History: no new issues overnight. Patient S/p  right thoracentesis 2/12/0217  with 850 ml fluid obtained. Fluid for culture- negative and  AFB, and fungus still pending. Still with mild SWANN and cough.   Going outside to smoke. Counseled on smoking cessation. Wife at BS    Review of Systems   Constitutional: Positive for fatigue. Negative for chills and fever.   HENT: Negative for ear pain and hearing loss.    Respiratory: Positive for cough and shortness of breath. Negative for apnea, choking, chest tightness, wheezing and stridor.    Cardiovascular: Positive for chest pain (Right sided ). Negative for palpitations and leg swelling.   Gastrointestinal: Negative for abdominal pain and blood in stool.   Genitourinary: Negative for frequency and hematuria.   Musculoskeletal: Positive for back pain (Right sided). Negative for neck pain and neck stiffness.   Neurological: Negative for syncope and weakness.   Psychiatric/Behavioral: Negative for agitation, confusion and suicidal ideas. The patient is not nervous/anxious.      Objective:     Vital Signs (Most Recent):  Temp: 98.2 °F (36.8 °C) (02/14/18 1134)  Pulse: 72 (02/14/18 1134)  Resp: 18 (02/14/18 1134)  BP: (!) 104/58 (02/14/18 1134)  SpO2: (!) 93 % (02/14/18 1134) Vital Signs (24h Range):  Temp:  [98.2 °F (36.8 °C)-100.3 °F (37.9 °C)] 98.2 °F (36.8 °C)  Pulse:  [59-88] 72  Resp:  [14-20] 18  SpO2:  [92 %-98 %] 93 %  BP: ()/(54-59) 104/58     Weight: 105.3 kg (232 lb 3.2 oz)  Body mass index is 28.26 kg/m².    Physical Exam   Constitutional: He is oriented to person, place, and time. He appears well-developed and well-nourished. No distress.   HENT:   Head: Normocephalic and atraumatic.   Eyes: Conjunctivae and EOM are normal. Pupils are equal, round, and reactive to light.   Neck: Normal range of motion. Neck supple.   Cardiovascular: Normal rate, regular rhythm, normal heart sounds and intact distal pulses.  Exam reveals no gallop.    No murmur  heard.  Pulses:       Dorsalis pedis pulses are 2+ on the right side, and 2+ on the left side.   Pulmonary/Chest: Effort normal. No accessory muscle usage. No respiratory distress. He has decreased breath sounds in the right middle field and the right lower field. He has no rhonchi. He has no rales.   significantly decreased on the right. Coarse    Abdominal: Soft. Bowel sounds are normal. He exhibits no distension. There is no tenderness. There is no guarding.   Genitourinary:   Genitourinary Comments: Not examined   Musculoskeletal: Normal range of motion. He exhibits no edema, tenderness or deformity.   Neurological: He is alert and oriented to person, place, and time. He has normal strength. No cranial nerve deficit.   Skin: Skin is warm, dry and intact. Capillary refill takes less than 2 seconds. He is not diaphoretic.   Psychiatric: He has a normal mood and affect. His speech is normal and behavior is normal. Judgment and thought content normal.   Vitals reviewed.        CRANIAL NERVES     CN III, IV, VI   Pupils are equal, round, and reactive to light.  Extraocular motions are normal.      Labs: Reviewed         Oriented - self; Oriented - place; Oriented - time

## 2020-02-03 NOTE — ED PROVIDER NOTE - PROGRESS NOTE DETAILS
Called over by family since pt seemed altered. Went to bedside and pt confused. Responding but not making sense. Positive rigors. Vomited once. Pt upgraded to [facility] res room. Temp taken rectally at 106.2F. Ice and cooling blanket placed on pt. IV Tylenol ordered. Pt coming back to baseline and is oriented. Pt now oriented to time and place. CXR reviewed; clear w/ no infiltrate. Lactate normal. Elevated white count of 25. CT head/chest/abdomen ordered. Abx and fluids as per sepsis protocol started. Pt also states over the past few days he had some difficulty urinating and dysuria. States he has had a hx of prostatitis in the past.

## 2020-02-03 NOTE — ED PROVIDER NOTE - CLINICAL SUMMARY MEDICAL DECISION MAKING FREE TEXT BOX
68 y/o M PMHx HTN p/w fever to 103.5F this morning and dry cough x 2 W. Pt took Medrol DosePack 2 W ago w/ improved cough. Once it finished, he started coughing again. No CP, SOB, recent travel, nausea, vomiting. Positive sick contacts at work. Pt temperature at 100.6F currently. Nontoxic appearing. His pharynx is clear, lungs are clear to auscultation bilaterally. Will do labs, flu swab, CXR and hydrate.

## 2020-02-03 NOTE — ED PROVIDER NOTE - OBJECTIVE STATEMENT
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 Pt reports that 16 D ago he had a sore throat that developed into body aches and a cough. Pt then took TamiFlu as a precaution w/ unknown effects because he has had many positive sick contacts w/ flu his own patients. He then took a Medrol-Dose pack 1 W ago w/ significant improvement in his cough, body aches, and sore throat. Once he finished the steroids, his symptoms came back and he started feeling weak again 2 D ago. Today he presents with new onset 103.5F fever. Pt denies getting the flu shot, flu swab, CP, SOB, current body aches, current sore throat/ runny nose, nausea, vomiting, taking any fever medications since " It will go away on its own", fever 16 D ago. He traveled to [facility] 2 M ago. His PCP is Dr. Gelacio Melgar. 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 Pt reports that 16 D ago he had a sore throat that developed into body aches and a cough. Pt then took TamiFlu as a precaution because he has had many positive sick contacts w/ flu in his own patients. He then took a Medrol-Dose pack 1 W ago w/ significant improvement in his cough, body aches, and sore throat. Once he finished the steroids, his symptoms came back and he started feeling weak again 2 D ago. Today he presents with new onset 103.5F fever. Pt denies getting the flu shot, flu swab, CP, SOB, current body aches, current sore throat/ runny nose, nausea, vomiting, taking any fever medications since " It will go away on its own", fever 16 D ago. He traveled to [facility] 2 M ago. His PCP is Dr. Gelacio Melgar. Pt also states over the past few days he had some difficulty urinating and dysuria

## 2020-02-03 NOTE — CONSULT NOTE ADULT - ASSESSMENT
This is a 69 y.o male w/ HTN who presented for fever from home. He was found to have sepsis 2/2 R ureteritis/ pyelonephritis    #R ureteritis/ pyelonephritis  - Patient presented with fevers, chills and urinary urgency. He had a cough that resolved after Z pack and medrol dos pack. On presentation, he met sepsis criteria for fever, tachycardia and WC with normal lactate. Source of infection is urinary tract. CXR without any consolidation and RVP negative.   - He is now s/p vancomycin and cefepime in ED  - Continue 2g ceftriaxone for pyelonephritis   - Follow up BCx  - Follow up UCx    Dispo: Admit to [facility]

## 2020-02-03 NOTE — H&P ADULT - NSICDXPASTMEDICALHX_GEN_ALL_CORE_FT
PAST MEDICAL HISTORY:  Chronic back pain     HTN (hypertension)     Lymphocytosis     Squamous cell carcinoma of skin

## 2020-02-03 NOTE — H&P ADULT - PROBLEM SELECTOR PLAN 5
Pt w/ hx of chronic lymphocytosis where he has a high number of lymphocytes in his blood. Says he had malignancy workup and it was negative. Pt has had this for many years  -not an active issue currently

## 2020-02-03 NOTE — H&P ADULT - HISTORY OF PRESENT ILLNESS
incomplete 70 yo M PMH HTN, prostatitis, chronic back pain (2/2 MSK issues), p/w fevers + urinary discomfort for the last 3 days. Pt says he had been having  some "discomfort"/urgency w/ urination for the last 3 days, and checked his temperature at home and it was 103.5F. Pt says he has a hx of chronic  back pain that is due to chronic wear and tear of his back over the years. 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   Pt also mentions he had a dry cough a couple weeks ago w/ myalgia' s and thought he had the flu since he’s exposed to patients, so finished course of Tamiflu and solumedrol pack. Symptoms started to resolve until the fever started today. Traveled to [facility] 2 M ago. Denies chest pain, sob, abdominal pain, hematuria, hematochezia, constipation, diarrhea. Admits to fevers +chronic back pain + urinary urgency HPI: 70 yo M PMH HTN, prostatitis, chronic back pain (2/2 MSK issues), chronic lymphocytosis, skin SCC w/ resection/biopsy p/w fevers + urinary discomfort for the last 3 days. Pt says he had been having some "discomfort"/urgency w/ urination for the last 3 days, and checked his temperature at home and it was 103.5F. Pt says he has a hx of chronic back pain that is due to chronic wear and tear of his back over the years. 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 Pt also mentions he had a dry cough a couple weeks ago w/ myalgia' s and thought he had the flu since he’s exposed to patients, so finished course of Tamiflu and solumedrol pack. Symptoms started to resolve until the fever started today. Traveled to [facility] 2 M ago. Denies chest pain, sob, abdominal pain, hematuria, hematochezia, constipation, diarrhea. Admits to fevers +chronic back pain + urinary urgency    ED Vitals: 100.6F-à106.2F rectal, HR 90-à104, 133/84, RR 18, SPO2 98 RA  ED Labs: WBC 25.84, elevated neutrophils, creatinine 1.28, U/A=+protein, +nitrite, +leuk esterase, large blood, >10 wbc, RVP neg, flu/rsv neg  ED Imaging: CT head neg for bleed, CT chest neg for consolidation, bowel obstruction or free air, CT abdomen showed right ureteritis/UTI concerning for acute pyelonephritis (no stones or obstruction seen)  ED Course: given 975mg +1 g Tylenol, 15mg toradol IV, 1g vancomycin and 2g cefepime, started 2g ceftriaxone for pyelonephritis. S/p 2.2 L .9NS

## 2020-02-03 NOTE — ED ADULT NURSE REASSESSMENT NOTE - NS ED NURSE REASSESS COMMENT FT1
pt became suddenly alter, not knowing where is he, not answering questions asked. pt brought to resus room, and endorsed to nurse Ponce.

## 2020-02-03 NOTE — PROGRESS NOTE ADULT - SUBJECTIVE AND OBJECTIVE BOX
Patient seen and examined in the holding area of the ER by myself, Gelacio Melgar MD, Family medicine.  History , physical and findings per Critical Care Resident below, since patient being admitted under the care of Dr Campos in MICU.        Consult Note Adult-Critical Care Resident [Charted Location: [place] [address]] [Authored: 2020 17:45]- for Visit: [id number], Complete, Not Revised, Signed in Full, General    Consult Note:    Provider Specialty:  Critical Care.    Referral/Consultation:    Initial Consult:  · Requested by Name:	Dr. Rosales	  · Date/Time:	2020 17:46	  · Reason for Referral/Consultation:	sepsis	      · Subjective and Objective: 	  CONSULT NOTE:     This is a 69 y.o male w/ HTN who presented from home after having a fever of 103. Per patient, 2 weeks ago, he had a dry cough for which he took a Z pack without improvement. He prescribed himself medrol dose pack ( last dose Sat) after which his cough resolved. Since , he has been feeling extremely weak. He denies any back pain, abdominal pain, N/V/D/C but ROS positive for urinary urgency, fevers and chills. Per family at bedside, he had a brief episode of altered mental status at the time of fever that resolved.   On ED arrival, his VS were notable for rectal Temp of 106, 's. He was given 2.2 L NS, vancomycin and cefepime.   His labs were notable for WC 25k with neutrophil predominance, positive UA, CXR with no consolidation. RVP negative.   CT abdomen and pelvis showed findings suggestive of R  ureteritis and pyelonephritis.     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 No CVA tenderness  Extremities: WWP; no edema, clubbing or cyanosis  Vascular: 2+ radial, DP/PT pulses B/L  Neurological: AAOx3; no focal deficits    MEDICATIONS:  MEDICATIONS  (STANDING):    MEDICATIONS  (PRN):      ALLERGIES:  Allergies    penicillin (Other)    Intolerances        LABS:                        15.0   25.84 )-----------( 349      ( 2020 14:10 )             45.3     02-    138  |  97  |  15  ----------------------------<  144<H>  4.5   |  26  |  1.28    Ca    9.4      2020 14:10    TPro  7.3  /  Alb  3.9  /  TBili  0.9  /  DBili  x   /  AST  27  /  ALT  46<H>  /  AlkPhos  77  02-03      Urinalysis Basic - ( 2020 15:23 )    Color: Yellow / Appearance: Clear / S.015 / pH: x  Gluc: x / Ketone: NEGATIVE  / Bili: Negative / Urobili: 0.2 E.U./dL   Blood: x / Protein: 30 mg/dL / Nitrite: POSITIVE   Leuk Esterase: Small / RBC: > 10 /HPF / WBC > 10 /HPF   Sq Epi: x / Non Sq Epi: 0-5 /HPF / Bacteria: Present /HPF      CAPILLARY BLOOD GLUCOSE            < from: CT Abdomen and Pelvis w/ IV Cont (20 @ 16:07) >  Impression:   1.  Negative for consolidation of the lung.  2.  No evidence of bowel obstruction or free air.  3.  Findings in reference to the right ureter suggests acute ureteritis/UTI. Findings in reference to the right kidney suggest acute pyelonephritis. Correlate with urine culture and microscopy.        < end of copied text >      Assessment and Recommendation:   · Assessment		  This is a 69 y.o male w/ HTN who presented for fever from home. He was found to have sepsis 2/2 R ureteritis/ pyelonephritis    #R ureteritis/ pyelonephritis  - Patient presented with fevers, chills and urinary urgency. He had a cough that resolved after Z pack and medrol dos pack. On presentation, he met sepsis criteria for fever, tachycardia and WC with normal lactate. Source of infection is urinary tract. CXR without any consolidation and RVP negative.   - He is now s/p vancomycin and cefepime in ED  - Continue 2g ceftriaxone for pyelonephritis   - Follow up BCx  - Follow up UCx    Dispo: Admit to [facility]      Electronic Signatures:  Elena Escalera)   (Signed 2020 18:05)  	Authored: Consult Note, Referral/Consultation, Assessment and Recommendation, Subjective and Objective  Cyndi Vasquez)   (Signed 2020 20:46)  	Co-Signer: Consult Note, Referral/Consultation, Subjective and Objective, Assessment and Recommendation    Last Updated: 2020 20:46 by Cyndi [email or web address])

## 2020-02-03 NOTE — H&P ADULT - PROBLEM SELECTOR PLAN 7
-PCP Contacted on Admission: (Y/N) -->  Dr. Gelacio Melgar  -Date of Contact with PCP:  -PCP Contacted at Discharge: (Y/N, N/A)  -Summary of Handoff Given to PCP:   -Post-Discharge Appointment Date and Location:

## 2020-02-04 DIAGNOSIS — R78.81 BACTEREMIA: ICD-10-CM

## 2020-02-04 LAB
ANION GAP SERPL CALC-SCNC: 13 MMOL/L — SIGNIFICANT CHANGE UP (ref 5–17)
BASOPHILS # BLD AUTO: 0.04 K/UL — SIGNIFICANT CHANGE UP (ref 0–0.2)
BASOPHILS NFR BLD AUTO: 0.3 % — SIGNIFICANT CHANGE UP (ref 0–2)
BUN SERPL-MCNC: 15 MG/DL — SIGNIFICANT CHANGE UP (ref 7–23)
CALCIUM SERPL-MCNC: 8.2 MG/DL — LOW (ref 8.4–10.5)
CHLORIDE SERPL-SCNC: 105 MMOL/L — SIGNIFICANT CHANGE UP (ref 96–108)
CO2 SERPL-SCNC: 22 MMOL/L — SIGNIFICANT CHANGE UP (ref 22–31)
CREAT SERPL-MCNC: 0.94 MG/DL — SIGNIFICANT CHANGE UP (ref 0.5–1.3)
CULTURE RESULTS: SIGNIFICANT CHANGE UP
E COLI DNA BLD POS QL NAA+NON-PROBE: SIGNIFICANT CHANGE UP
EOSINOPHIL # BLD AUTO: 0.03 K/UL — SIGNIFICANT CHANGE UP (ref 0–0.5)
EOSINOPHIL NFR BLD AUTO: 0.2 % — SIGNIFICANT CHANGE UP (ref 0–6)
GLUCOSE BLDC GLUCOMTR-MCNC: 109 MG/DL — HIGH (ref 70–99)
GLUCOSE BLDC GLUCOMTR-MCNC: 127 MG/DL — HIGH (ref 70–99)
GLUCOSE BLDC GLUCOMTR-MCNC: 128 MG/DL — HIGH (ref 70–99)
GLUCOSE BLDC GLUCOMTR-MCNC: 158 MG/DL — HIGH (ref 70–99)
GLUCOSE SERPL-MCNC: 112 MG/DL — HIGH (ref 70–99)
GRAM STN FLD: SIGNIFICANT CHANGE UP
GRAM STN FLD: SIGNIFICANT CHANGE UP
HBA1C BLD-MCNC: 6 % — HIGH (ref 4–5.6)
HCT VFR BLD CALC: 38.5 % — LOW (ref 39–50)
HCV AB S/CO SERPL IA: 0.04 S/CO — SIGNIFICANT CHANGE UP
HCV AB SERPL-IMP: SIGNIFICANT CHANGE UP
HGB BLD-MCNC: 12.4 G/DL — LOW (ref 13–17)
IMM GRANULOCYTES NFR BLD AUTO: 1.3 % — SIGNIFICANT CHANGE UP (ref 0–1.5)
LYMPHOCYTES # BLD AUTO: 15.7 % — SIGNIFICANT CHANGE UP (ref 13–44)
LYMPHOCYTES # BLD AUTO: 2.37 K/UL — SIGNIFICANT CHANGE UP (ref 1–3.3)
MAGNESIUM SERPL-MCNC: 2.2 MG/DL — SIGNIFICANT CHANGE UP (ref 1.6–2.6)
MCHC RBC-ENTMCNC: 28.6 PG — SIGNIFICANT CHANGE UP (ref 27–34)
MCHC RBC-ENTMCNC: 32.2 GM/DL — SIGNIFICANT CHANGE UP (ref 32–36)
MCV RBC AUTO: 88.7 FL — SIGNIFICANT CHANGE UP (ref 80–100)
METHOD TYPE: SIGNIFICANT CHANGE UP
MONOCYTES # BLD AUTO: 1.37 K/UL — HIGH (ref 0–0.9)
MONOCYTES NFR BLD AUTO: 9.1 % — SIGNIFICANT CHANGE UP (ref 2–14)
NEUTROPHILS # BLD AUTO: 11.12 K/UL — HIGH (ref 1.8–7.4)
NEUTROPHILS NFR BLD AUTO: 73.4 % — SIGNIFICANT CHANGE UP (ref 43–77)
NRBC # BLD: 0 /100 WBCS — SIGNIFICANT CHANGE UP (ref 0–0)
PHOSPHATE SERPL-MCNC: 2.1 MG/DL — LOW (ref 2.5–4.5)
PLATELET # BLD AUTO: 251 K/UL — SIGNIFICANT CHANGE UP (ref 150–400)
POTASSIUM SERPL-MCNC: 3.9 MMOL/L — SIGNIFICANT CHANGE UP (ref 3.5–5.3)
POTASSIUM SERPL-SCNC: 3.9 MMOL/L — SIGNIFICANT CHANGE UP (ref 3.5–5.3)
RBC # BLD: 4.34 M/UL — SIGNIFICANT CHANGE UP (ref 4.2–5.8)
RBC # FLD: 13.9 % — SIGNIFICANT CHANGE UP (ref 10.3–14.5)
SODIUM SERPL-SCNC: 140 MMOL/L — SIGNIFICANT CHANGE UP (ref 135–145)
SPECIMEN SOURCE: SIGNIFICANT CHANGE UP
WBC # BLD: 15.12 K/UL — HIGH (ref 3.8–10.5)
WBC # FLD AUTO: 15.12 K/UL — HIGH (ref 3.8–10.5)

## 2020-02-04 PROCEDURE — 99233 SBSQ HOSP IP/OBS HIGH 50: CPT | Mod: GC

## 2020-02-04 PROCEDURE — 76775 US EXAM ABDO BACK WALL LIM: CPT | Mod: 26

## 2020-02-04 PROCEDURE — 76770 US EXAM ABDO BACK WALL COMP: CPT | Mod: 26

## 2020-02-04 RX ORDER — FAMOTIDINE 10 MG/ML
20 INJECTION INTRAVENOUS DAILY
Refills: 0 | Status: DISCONTINUED | OUTPATIENT
Start: 2020-02-04 | End: 2020-02-05

## 2020-02-04 RX ADMIN — ENOXAPARIN SODIUM 40 MILLIGRAM(S): 100 INJECTION SUBCUTANEOUS at 22:32

## 2020-02-04 RX ADMIN — FAMOTIDINE 20 MILLIGRAM(S): 10 INJECTION INTRAVENOUS at 19:35

## 2020-02-04 RX ADMIN — SODIUM CHLORIDE 100 MILLILITER(S): 9 INJECTION INTRAMUSCULAR; INTRAVENOUS; SUBCUTANEOUS at 00:00

## 2020-02-04 RX ADMIN — CEFTRIAXONE 100 MILLIGRAM(S): 500 INJECTION, POWDER, FOR SOLUTION INTRAMUSCULAR; INTRAVENOUS at 19:24

## 2020-02-04 NOTE — PROGRESS NOTE ADULT - PROBLEM SELECTOR PLAN 4
Pt w/ hx of HTN, takes coreg 12.5 mg qd and Nifedipine 20 mg ER qhs at home. Currently normotensive.  - hold bp meds in setting of sepsis

## 2020-02-04 NOTE — PROGRESS NOTE ADULT - NSHPATTENDINGPLANDISCUSS_GEN_ALL_CORE
Patient and his wife. Patient knows to follow up with my office after discharge. Currently attending of record is Dr Campos. I will continue to follow this patient along with Dr Campos.

## 2020-02-04 NOTE — PROGRESS NOTE ADULT - SUBJECTIVE AND OBJECTIVE BOX
O/N Events: admitted to [facility]    Subjective: Patient seen at the bedside. Reports that he is feeling better but still has back pain that is different from his chronic back pain. He also has discomfort from the son catheter. Otherwise no nausea or other complaints.    VITALS  Vital Signs Last 24 Hrs  T(C): 37.3 (2020 06:00), Max: 41.2 (2020 14:46)  T(F): 99.1 (2020 06:00), Max: 106.2 (2020 14:46)  HR: 66 (2020 03:45) (66 - 104)  BP: 114/67 (2020 03:45) (111/74 - 136/81)  BP(mean): 86 (2020 03:45) (86 - 104)  RR: 17 (2020 03:45) (17 - 18)  SpO2: 96% (2020 03:45) (94% - 100%)    CAPILLARY BLOOD GLUCOSE  POCT Blood Glucose.: 109 mg/dL (2020 06:02)  POCT Blood Glucose.: 109 mg/dL (2020 21:54)      PHYSICAL EXAM  General appearance: patient sleeping, NAD  HEENT: PERRLA, EOMI, sclera anicteric  Respiratory: breath sounds clear to auscultation throughout all lung fields, no accessory muscle use, no wheezing/rales/rhonchi   Cardiovascular: regular rate and rhythm, normal S1/S2, no murmurs, rubs or gallops appreciated  Gastrointestinal: soft, non-tender, non-distended, normoactive bowel sounds  Back: no CVAT  Extremities: WWP, no LE edema  Neurological: AOx3, no obvious focal deficits    MEDICATIONS  (STANDING):  cefTRIAXone   IVPB 2000 milliGRAM(s) IV Intermittent every 24 hours  dextrose 5%. 1000 milliLiter(s) (50 mL/Hr) IV Continuous <Continuous>  dextrose 50% Injectable 12.5 Gram(s) IV Push once  dextrose 50% Injectable 25 Gram(s) IV Push once  dextrose 50% Injectable 25 Gram(s) IV Push once  enoxaparin Injectable 40 milliGRAM(s) SubCutaneous every 24 hours  insulin lispro (HumaLOG) corrective regimen sliding scale   SubCutaneous Before meals and at bedtime  sodium chloride 0.9%. 1000 milliLiter(s) (100 mL/Hr) IV Continuous <Continuous>    MEDICATIONS  (PRN):  acetaminophen   Tablet .. 650 milliGRAM(s) Oral every 4 hours PRN Temp greater or equal to 38.5C (101.3F), Mild Pain (1 - 3)  dextrose 40% Gel 15 Gram(s) Oral once PRN Blood Glucose LESS THAN 70 milliGRAM(s)/deciliter  glucagon  Injectable 1 milliGRAM(s) IntraMuscular once PRN Glucose LESS THAN 70 milligrams/deciliter      penicillin (Other)      LABS                        12.4   15.12 )-----------( 251      ( 2020 07:00 )             38.5     02-04    140  |  105  |  15  ----------------------------<  112<H>  3.9   |  22  |  0.94    Ca    8.2<L>      2020 07:00  Phos  2.1     02-04  Mg     2.2     02-04    TPro  7.3  /  Alb  3.9  /  TBili  0.9  /  DBili  x   /  AST  27  /  ALT  46<H>  /  AlkPhos  77  02-03      Urinalysis Basic - ( 2020 15:23 )    Color: Yellow / Appearance: Clear / S.015 / pH: x  Gluc: x / Ketone: NEGATIVE  / Bili: Negative / Urobili: 0.2 E.U./dL   Blood: x / Protein: 30 mg/dL / Nitrite: POSITIVE   Leuk Esterase: Small / RBC: > 10 /HPF / WBC > 10 /HPF   Sq Epi: x / Non Sq Epi: 0-5 /HPF / Bacteria: Present /HPF            PROCEDURES/IMAGING: reviewed. TRANSFER FROM [facility] TO [facility]  70 yo M PMH HTN, prostatitis, chronic back pain (2/2 MSK issues), chronic lymphocytosis, skin SCC w/ resection/biopsy who presented with fevers + urinary discomfort for the last 3 days. Patient met sepsis criteria at the time of admission and was found to have right ureteritis/UTI concerning for acute pyelonephritis (no stones or obstruction seen). Urine cultures were negative, however antibiotics had been given previous to this. The patient was given Vanc/Cefepime x1 and continued on Ceftriaxone. Blood cultures grew E. coli. The patient has remained afebrile with stable vital signs. Clinically he is improving with no CVAT on exam. He is stable for further treatment on the regional medical floors.    O/N Events: admitted to [facility]    Subjective: Patient seen at the bedside. Reports that he is feeling better but still has back pain that is different from his chronic back pain. He also has discomfort from the son catheter. Otherwise no nausea or other complaints.    VITALS  Vital Signs Last 24 Hrs  T(C): 37.3 (2020 06:00), Max: 41.2 (2020 14:46)  T(F): 99.1 (2020 06:00), Max: 106.2 (2020 14:46)  HR: 66 (2020 03:45) (66 - 104)  BP: 114/67 (2020 03:45) (111/74 - 136/81)  BP(mean): 86 (2020 03:45) (86 - 104)  RR: 17 (2020 03:45) (17 - 18)  SpO2: 96% (2020 03:45) (94% - 100%)    CAPILLARY BLOOD GLUCOSE  POCT Blood Glucose.: 109 mg/dL (2020 06:02)  POCT Blood Glucose.: 109 mg/dL (2020 21:54)      PHYSICAL EXAM  General appearance: patient sleeping, NAD  HEENT: PERRLA, EOMI, sclera anicteric  Respiratory: breath sounds clear to auscultation throughout all lung fields, no accessory muscle use, no wheezing/rales/rhonchi   Cardiovascular: regular rate and rhythm, normal S1/S2, no murmurs, rubs or gallops appreciated  Gastrointestinal: soft, non-tender, non-distended, normoactive bowel sounds  Back: no CVAT  Extremities: WWP, no LE edema  Neurological: AOx3, no obvious focal deficits    MEDICATIONS  (STANDING):  cefTRIAXone   IVPB 2000 milliGRAM(s) IV Intermittent every 24 hours  dextrose 5%. 1000 milliLiter(s) (50 mL/Hr) IV Continuous <Continuous>  dextrose 50% Injectable 12.5 Gram(s) IV Push once  dextrose 50% Injectable 25 Gram(s) IV Push once  dextrose 50% Injectable 25 Gram(s) IV Push once  enoxaparin Injectable 40 milliGRAM(s) SubCutaneous every 24 hours  insulin lispro (HumaLOG) corrective regimen sliding scale   SubCutaneous Before meals and at bedtime  sodium chloride 0.9%. 1000 milliLiter(s) (100 mL/Hr) IV Continuous <Continuous>    MEDICATIONS  (PRN):  acetaminophen   Tablet .. 650 milliGRAM(s) Oral every 4 hours PRN Temp greater or equal to 38.5C (101.3F), Mild Pain (1 - 3)  dextrose 40% Gel 15 Gram(s) Oral once PRN Blood Glucose LESS THAN 70 milliGRAM(s)/deciliter  glucagon  Injectable 1 milliGRAM(s) IntraMuscular once PRN Glucose LESS THAN 70 milligrams/deciliter      penicillin (Other)      LABS                        12.4   15.12 )-----------( 251      ( 2020 07:00 )             38.5     02-04    140  |  105  |  15  ----------------------------<  112<H>  3.9   |  22  |  0.94    Ca    8.2<L>      2020 07:00  Phos  2.1     02-04  Mg     2.2     02-04    TPro  7.3  /  Alb  3.9  /  TBili  0.9  /  DBili  x   /  AST  27  /  ALT  46<H>  /  AlkPhos  77  02-03      Urinalysis Basic - ( 2020 15:23 )    Color: Yellow / Appearance: Clear / S.015 / pH: x  Gluc: x / Ketone: NEGATIVE  / Bili: Negative / Urobili: 0.2 E.U./dL   Blood: x / Protein: 30 mg/dL / Nitrite: POSITIVE   Leuk Esterase: Small / RBC: > 10 /HPF / WBC > 10 /HPF   Sq Epi: x / Non Sq Epi: 0-5 /HPF / Bacteria: Present /HPF            PROCEDURES/IMAGING: reviewed.

## 2020-02-04 NOTE — PROGRESS NOTE ADULT - PROBLEM SELECTOR PLAN 1
Pt septic at presentation (89 F, tachy, wbc, u/a +) 2/2 pyelonephritis 2/2 UTI. UA positive. CT abdomen showed right ureteritis/UTI concerning for acute pyelonephritis (no stones or obstruction seen). No CVAT on exam. VSS, leukocytosis resolving. BCx growing E. Coli. UCx negative (collected after Abx were started).  - continue Ceftriaxone 2 g daily (2/3 - 2/17)  - continue NS @ 100 cc/hr  - f/u US retroperitoneal   - urology consulted - no intervention at this time Pt septic at presentation (89 F, tachy, wbc, u/a +) 2/2 pyelonephritis 2/2 UTI. UA positive. CT abdomen showed right ureteritis/UTI concerning for acute pyelonephritis (no stones or obstruction seen). No CVAT on exam. VSS, leukocytosis resolving. BCx growing E. Coli. UCx negative (collected after Abx were started).  - continue Ceftriaxone 2 g daily (2/3 - 2/17)  - f/u US retroperitoneal   - urology consulted - no intervention at this time

## 2020-02-04 NOTE — PROGRESS NOTE ADULT - SUBJECTIVE AND OBJECTIVE BOX
Patient seen 10:30 this am. Wifeat bedside and fully informed of patient's condition.    Feeling much better. No specific complaints at present.    Continued Tx with fluids and Ceftriaxone has achieved a reduction of the WBC from 25,000 to 15,000, and the Temp from a mx of 106 yesterday to just over 99F today.  Blood cultures with E Coli, in 2. Urine analysis c.w. UTI but culture negative since urine collection was not possible prior to the initial dose of abx in view of the sepsis.    Able to tolerate diet and fully alert and oriented.  Hemodynamically stable, normotensive and in good SR at 60-70 bpm.  Lungs clear.  Abdomen without discomfort, good bowel sounds and no CVAT.  Extremities without edema or calf tenderness.  Son in situ with clear urine output and due for removal.

## 2020-02-04 NOTE — PROGRESS NOTE ADULT - PROBLEM SELECTOR PLAN 2
Patient found to have blood cultures growing E. coli. Has been afebrile since starting antibiotics, no rigors, patient feeling better overall. Likely due to urinary infection.  - continue Ceftriaxone 2 g daily (2/3 - 2/17)  - surveillance cultures

## 2020-02-04 NOTE — PROGRESS NOTE ADULT - PROBLEM SELECTOR PLAN 7
F: NS @ 100 cc/hr  E: replete  N: carb consistent  A: Lovenox F: none  E: replete  N: carb consistent  A: Lovenox

## 2020-02-04 NOTE — PROGRESS NOTE ADULT - PROBLEM SELECTOR PLAN 6
Pt w/ hx of chronic lymphocytosis where he has a high number of lymphocytes in his blood. Says he had malignancy workup and it was negative. Pt has had this for many years  - plan for follow up outpatient

## 2020-02-04 NOTE — PROGRESS NOTE ADULT - ATTENDING COMMENTS
Patient seen and examined with house-staff during bedside rounds.  Resident note read, including vitals, physical findings, laboratory data, and radiological reports.   Revisions included below.  Direct personal management at bed side and extensive interpretation of the data.  Plan was outlined and discussed in details with the housestaff.  Decision making of high complexity  Action taken for acute disease activity to reflect the level of care provided:  - medication reconciliation  - review laboratory data  sepsis resolved  HR improved  positive blood culture  repeat blood culture  Increase activity  will discuss the Son with urology

## 2020-02-04 NOTE — PROGRESS NOTE ADULT - PROBLEM SELECTOR PLAN 3
RESOLVED. Pt septic at presentation (106 F, tachy, wbc, u/a +) 2/2 pyelonephritis 2/2 UTI. Pt p/w chronic back pain + fevers (103.5 F at home) for weeks. 106.F rectal in ED, leukocytosis 25.84, elevated neutrophils, U/A=+protein, +nitrite, +leuk esterase, large blood, >10 wbc. CT abdomen showed right ureteritis/UTI concerning for acute pyelonephritis (no stones or obstruction seen). BCx growing E. Coli. UCx negative (collected after Abx were started).  - continue Ceftriaxone 2 g daily (2/3 - 2/17)  - continue NS @ 100 cc/hr  - f/u US retroperitoneal   - urology consulted - no intervention at this time RESOLVED. Pt septic at presentation (106 F, tachy, wbc, u/a +) 2/2 pyelonephritis 2/2 UTI. Pt p/w chronic back pain + fevers (103.5 F at home) for weeks. 106.F rectal in ED, leukocytosis 25.84, elevated neutrophils, U/A=+protein, +nitrite, +leuk esterase, large blood, >10 wbc. CT abdomen showed right ureteritis/UTI concerning for acute pyelonephritis (no stones or obstruction seen). BCx growing E. Coli. UCx negative (collected after Abx were started).  - continue Ceftriaxone 2 g daily (2/3 - 2/17)  - f/u US retroperitoneal   - urology consulted - no intervention at this time

## 2020-02-04 NOTE — PATIENT PROFILE ADULT - ABILITY TO HEAR (WITH HEARING AID OR HEARING APPLIANCE IF NORMALLY USED):
----- Message from Gilles Kemp MD sent at 12/8/2019  2:19 PM CST -----  There is protein in the urinalysis. This can happen with dehydration or fasting. Please recheck this at a non fasting lab visit within 1-2 weeks.  One of the liver tests is high. This can happen with fasting. Please recheck within 2 wks at a non fasting lab appt.   All of your other blood work is normal - cholesterol, cell counts,  , kidneys, and thyroid tests are normal.     Adequate: hears normal conversation without difficulty

## 2020-02-05 ENCOUNTER — TRANSCRIPTION ENCOUNTER (OUTPATIENT)
Age: 70
End: 2020-02-05

## 2020-02-05 VITALS — TEMPERATURE: 98 F

## 2020-02-05 LAB
-  AMPICILLIN/SULBACTAM: SIGNIFICANT CHANGE UP
-  AMPICILLIN/SULBACTAM: SIGNIFICANT CHANGE UP
-  AMPICILLIN: SIGNIFICANT CHANGE UP
-  AMPICILLIN: SIGNIFICANT CHANGE UP
-  CEFAZOLIN: SIGNIFICANT CHANGE UP
-  CEFAZOLIN: SIGNIFICANT CHANGE UP
-  CEFTRIAXONE: SIGNIFICANT CHANGE UP
-  CEFTRIAXONE: SIGNIFICANT CHANGE UP
-  CIPROFLOXACIN: SIGNIFICANT CHANGE UP
-  GENTAMICIN: SIGNIFICANT CHANGE UP
-  GENTAMICIN: SIGNIFICANT CHANGE UP
-  PIPERACILLIN/TAZOBACTAM: SIGNIFICANT CHANGE UP
-  PIPERACILLIN/TAZOBACTAM: SIGNIFICANT CHANGE UP
-  TOBRAMYCIN: SIGNIFICANT CHANGE UP
-  TOBRAMYCIN: SIGNIFICANT CHANGE UP
-  TRIMETHOPRIM/SULFAMETHOXAZOLE: SIGNIFICANT CHANGE UP
-  TRIMETHOPRIM/SULFAMETHOXAZOLE: SIGNIFICANT CHANGE UP
ANION GAP SERPL CALC-SCNC: 11 MMOL/L — SIGNIFICANT CHANGE UP (ref 5–17)
BUN SERPL-MCNC: 11 MG/DL — SIGNIFICANT CHANGE UP (ref 7–23)
CALCIUM SERPL-MCNC: 8.6 MG/DL — SIGNIFICANT CHANGE UP (ref 8.4–10.5)
CHLORIDE SERPL-SCNC: 108 MMOL/L — SIGNIFICANT CHANGE UP (ref 96–108)
CO2 SERPL-SCNC: 23 MMOL/L — SIGNIFICANT CHANGE UP (ref 22–31)
CREAT SERPL-MCNC: 0.97 MG/DL — SIGNIFICANT CHANGE UP (ref 0.5–1.3)
GLUCOSE SERPL-MCNC: 114 MG/DL — HIGH (ref 70–99)
HCT VFR BLD CALC: 38.9 % — LOW (ref 39–50)
HGB BLD-MCNC: 12.4 G/DL — LOW (ref 13–17)
MAGNESIUM SERPL-MCNC: 2.3 MG/DL — SIGNIFICANT CHANGE UP (ref 1.6–2.6)
MCHC RBC-ENTMCNC: 27.9 PG — SIGNIFICANT CHANGE UP (ref 27–34)
MCHC RBC-ENTMCNC: 31.9 GM/DL — LOW (ref 32–36)
MCV RBC AUTO: 87.6 FL — SIGNIFICANT CHANGE UP (ref 80–100)
METHOD TYPE: SIGNIFICANT CHANGE UP
NRBC # BLD: 0 /100 WBCS — SIGNIFICANT CHANGE UP (ref 0–0)
PLATELET # BLD AUTO: 266 K/UL — SIGNIFICANT CHANGE UP (ref 150–400)
POTASSIUM SERPL-MCNC: 4 MMOL/L — SIGNIFICANT CHANGE UP (ref 3.5–5.3)
POTASSIUM SERPL-SCNC: 4 MMOL/L — SIGNIFICANT CHANGE UP (ref 3.5–5.3)
RBC # BLD: 4.44 M/UL — SIGNIFICANT CHANGE UP (ref 4.2–5.8)
RBC # FLD: 13.7 % — SIGNIFICANT CHANGE UP (ref 10.3–14.5)
SODIUM SERPL-SCNC: 142 MMOL/L — SIGNIFICANT CHANGE UP (ref 135–145)
WBC # BLD: 12.58 K/UL — HIGH (ref 3.8–10.5)
WBC # FLD AUTO: 12.58 K/UL — HIGH (ref 3.8–10.5)

## 2020-02-05 PROCEDURE — 71260 CT THORAX DX C+: CPT

## 2020-02-05 PROCEDURE — 86803 HEPATITIS C AB TEST: CPT

## 2020-02-05 PROCEDURE — 85027 COMPLETE CBC AUTOMATED: CPT

## 2020-02-05 PROCEDURE — 87486 CHLMYD PNEUM DNA AMP PROBE: CPT

## 2020-02-05 PROCEDURE — 96375 TX/PRO/DX INJ NEW DRUG ADDON: CPT | Mod: XU

## 2020-02-05 PROCEDURE — 83036 HEMOGLOBIN GLYCOSYLATED A1C: CPT

## 2020-02-05 PROCEDURE — 83735 ASSAY OF MAGNESIUM: CPT

## 2020-02-05 PROCEDURE — 87150 DNA/RNA AMPLIFIED PROBE: CPT

## 2020-02-05 PROCEDURE — 76770 US EXAM ABDO BACK WALL COMP: CPT

## 2020-02-05 PROCEDURE — 74177 CT ABD & PELVIS W/CONTRAST: CPT

## 2020-02-05 PROCEDURE — 87581 M.PNEUMON DNA AMP PROBE: CPT

## 2020-02-05 PROCEDURE — 87798 DETECT AGENT NOS DNA AMP: CPT

## 2020-02-05 PROCEDURE — 82962 GLUCOSE BLOOD TEST: CPT

## 2020-02-05 PROCEDURE — 87631 RESP VIRUS 3-5 TARGETS: CPT

## 2020-02-05 PROCEDURE — 85025 COMPLETE CBC W/AUTO DIFF WBC: CPT

## 2020-02-05 PROCEDURE — 96365 THER/PROPH/DIAG IV INF INIT: CPT | Mod: XU

## 2020-02-05 PROCEDURE — 71046 X-RAY EXAM CHEST 2 VIEWS: CPT

## 2020-02-05 PROCEDURE — 80053 COMPREHEN METABOLIC PANEL: CPT

## 2020-02-05 PROCEDURE — 84100 ASSAY OF PHOSPHORUS: CPT

## 2020-02-05 PROCEDURE — 80048 BASIC METABOLIC PNL TOTAL CA: CPT

## 2020-02-05 PROCEDURE — 99239 HOSP IP/OBS DSCHRG MGMT >30: CPT

## 2020-02-05 PROCEDURE — 83605 ASSAY OF LACTIC ACID: CPT

## 2020-02-05 PROCEDURE — 99285 EMERGENCY DEPT VISIT HI MDM: CPT | Mod: 25

## 2020-02-05 PROCEDURE — 76775 US EXAM ABDO BACK WALL LIM: CPT

## 2020-02-05 PROCEDURE — 87086 URINE CULTURE/COLONY COUNT: CPT

## 2020-02-05 PROCEDURE — 70450 CT HEAD/BRAIN W/O DYE: CPT

## 2020-02-05 PROCEDURE — 87184 SC STD DISK METHOD PER PLATE: CPT

## 2020-02-05 PROCEDURE — 36415 COLL VENOUS BLD VENIPUNCTURE: CPT

## 2020-02-05 PROCEDURE — 87040 BLOOD CULTURE FOR BACTERIA: CPT

## 2020-02-05 PROCEDURE — 87186 SC STD MICRODIL/AGAR DIL: CPT

## 2020-02-05 PROCEDURE — 87633 RESP VIRUS 12-25 TARGETS: CPT

## 2020-02-05 PROCEDURE — 81001 URINALYSIS AUTO W/SCOPE: CPT

## 2020-02-05 PROCEDURE — 96368 THER/DIAG CONCURRENT INF: CPT

## 2020-02-05 RX ORDER — ACETAMINOPHEN 500 MG
2 TABLET ORAL
Qty: 0 | Refills: 0 | DISCHARGE
Start: 2020-02-05

## 2020-02-05 RX ORDER — AZTREONAM 2 G
1 VIAL (EA) INJECTION
Qty: 24 | Refills: 0
Start: 2020-02-05 | End: 2020-02-16

## 2020-02-05 NOTE — DISCHARGE NOTE PROVIDER - NSDCCPTREATMENT_GEN_ALL_CORE_FT
PRINCIPAL PROCEDURE  Procedure: CT abdomen  Findings and Treatment: Impression:   1.  Negative for consolidation of the lung.  2.  No evidence of bowel obstruction or free air.  3.  Findings in reference to the right ureter suggests acute ureteritis/UTI. Findings in reference to the right kidney suggest acute pyelonephritis. Correlate with urine culture and microscopy.      SECONDARY PROCEDURE  Procedure: Complete ultrasound of kidney  Findings and Treatment: IMPRESSION:  No hydronephrosis. No focal abnormality in the right kidney or partially visualized right ureter. Of note, ultrasound is limited in evaluating pyelonephritis.

## 2020-02-05 NOTE — DISCHARGE NOTE NURSING/CASE MANAGEMENT/SOCIAL WORK - PATIENT PORTAL LINK FT
You can access the FollowMyHealth Patient Portal offered by [facility] by registering at the following website: [email or web address]. By joining [facility]’s FollowMyHealth portal, you will also be able to view your health information using other applications (apps) compatible with our system.

## 2020-02-05 NOTE — DISCHARGE NOTE PROVIDER - NSDCCAREPROVSEEN_GEN_ALL_CORE_FT
Wanda Campos Wanda Campos A  Patient seen and examined with house-staff during bedside rounds.  Resident note read, including vitals, physical findings, laboratory data, and radiological reports.   Revisions included below.  Direct personal management at bed side and extensive interpretation of the data.  Plan was outlined and discussed in details with the housestaff.  Decision making of high complexity  Action taken for acute disease activity to reflect the level of care provided:  - medication reconciliation  - review laboratory data  The patient clinically stable. He is afebrile in normal sinus for his and no leukocytosis. The repeat blood culture is negative today. The blood culture grew Escherichia coli pansensitive. I discussed the case with his primary care physician. Patient is to be started on Bactrim and to followup with his private physician

## 2020-02-05 NOTE — DISCHARGE NOTE PROVIDER - HOSPITAL COURSE
Patient is 70 yo M with past medical history of HTN, prostatitis, chronic back pain (2/2 MSK issues), chronic lymphocytosis, skin SCC w/ resection/biopsy.    Presented with fever + urinary discomfort for the last 3 days, found to have sepsis criteria (106 F, tachy, wbc, u/a +) 2/2 pyelonephritis (CT abdomen showed right ureteritis/UTI concerning for acute pyelonephritis (no stones or obstruction seen)) 2/2 UTI.        Problem List/Main Diagnoses (system-based):     # Pyelonephritis    Pt septic at presentation (106 F, tachy, wbc, u/a +) 2/2 pyelonephritis 2/2 UTI. UA positive. CT abdomen showed right ureteritis/UTI concerning for acute pyelonephritis (no stones or obstruction seen). No CVAT on exam. VSS, leukocytosis resolving. BCx growing E. Coli. UCx negative (collected after Abx were started).    - Started on Ceftriaxone 2 g daily (2/3 - 2/17)    - US retroperitoneal without hydronephrosis    - urology consulted - no intervention at this time.         # Bacteremia    Patient found to have blood cultures growing E. coli. Has been afebrile since starting antibiotics, no rigors, patient feeling better overall. Likely due to urinary infection.    - continue Ceftriaxone 2 g daily (2/3 - 2/17)    - surveillance cultures.         # Sepsis    RESOLVED. Pt septic at presentation (106 F, tachy, wbc, u/a +) 2/2 pyelonephritis 2/2 UTI. Pt p/w chronic back pain + fevers (103.5 F at home) for weeks. 106.F rectal in ED, leukocytosis 25.84, elevated neutrophils, U/A=+protein, +nitrite, +leuk esterase, large blood, >10 wbc. CT abdomen showed right ureteritis/UTI concerning for acute pyelonephritis (no stones or obstruction seen). BCx growing E. Coli. UCx negative (collected after Abx were started).    - continue Ceftriaxone 2 g daily (2/3 - 2/17)    - f/u US retroperitoneal     - urology consulted - no intervention at this time.         # HTN (hypertension)    Pt w/ hx of HTN, takes coreg 12.5 mg qd and Nifedipine 20 mg ER qhs at home. Currently normotensive.    - hold bp meds in setting of sepsis.         # Squamous cell carcinoma of skin    History of SCC of skin s/p resection + biopsy's, last biopsy 2017.    - plan to follow up outpatient.         # Lymphocytosis    Pt w/ hx of chronic lymphocytosis where he has a high number of lymphocytes in his blood. Says he had malignancy workup and it was negative. Pt has had this for many years    - plan for follow up outpatient.            Inpatient treatment course:         New medications:         Labs to be followed outpatient:     Exam to be followed outpatient: Patient is 70 yo M with past medical history of HTN, prostatitis, chronic back pain (2/2 MSK issues), chronic lymphocytosis, skin SCC w/ resection/biopsy.    Presented with fever + urinary discomfort for the last 3 days, found to have sepsis criteria (106 F, tachy, wbc, u/a +) 2/2 pyelonephritis (CT abdomen showed right ureteritis/UTI concerning for acute pyelonephritis (no stones or obstruction seen)) 2/2 UTI.        Problem List/Main Diagnoses (system-based):     # Pyelonephritis    Pt septic at presentation (106 F, tachy, wbc, u/a +) 2/2 pyelonephritis 2/2 UTI. UA positive. CT abdomen showed right ureteritis/UTI concerning for acute pyelonephritis (no stones or obstruction seen). No CVAT on exam. VSS, leukocytosis resolving. BCx growing E. Coli. UCx negative (collected after Abx were started).    - Started on Ceftriaxone 2 g daily (2/3 - 2/17)    - US retroperitoneal without hydronephrosis    - urology consulted - no intervention at this time.         # Bacteremia    Patient found to have blood cultures growing E. coli. Has been afebrile since starting antibiotics, no rigors, patient feeling better overall. Likely due to urinary infection.    - continue Ceftriaxone 2 g daily (2/3 - 2/17)    - surveillance cultures.         # Sepsis    RESOLVED. Pt septic at presentation (106 F, tachy, wbc, u/a +) 2/2 pyelonephritis 2/2 UTI. Pt p/w chronic back pain + fevers (103.5 F at home) for weeks. 106.F rectal in ED, leukocytosis 25.84, elevated neutrophils, U/A=+protein, +nitrite, +leuk esterase, large blood, >10 wbc. CT abdomen showed right ureteritis/UTI concerning for acute pyelonephritis (no stones or obstruction seen). BCx growing E. Coli. UCx negative (collected after Abx were started).    - Started Ceftriaxone 2 g daily (2/3 - 2/17)    - urology consulted - no intervention at this time.         # HTN (hypertension)    Pt w/ hx of HTN, takes coreg 12.5 mg qd and Nifedipine 20 mg ER qhs at home. Currently normotensive.    - held bp meds in setting of sepsis.         # Squamous cell carcinoma of skin    History of SCC of skin s/p resection + biopsy's, last biopsy 2017.    - plan to follow up outpatient.         # Lymphocytosis    Pt w/ hx of chronic lymphocytosis where he has a high number of lymphocytes in his blood. Says he had malignancy workup and it was negative. Pt has had this for many years    - plan for follow up outpatient.            Inpatient treatment course: Ceftriaxone 2 grams daily, held home BP medications        New medications: Bactrim DS 1 tablet to complete an antibiotics course of 14 days        Labs to be followed outpatient: CBC (WBC)    Exam to be followed outpatient: Urology exam

## 2020-02-05 NOTE — DISCHARGE NOTE PROVIDER - CARE PROVIDER_API CALL
Gelacio Melgar)  Family Medicine  [address] [place] [id number]  Phone: [phone]  Fax: [phone]  Established Patient  Follow Up Time: 2 weeks    Ag Quinonez)  Urology  [address] [address]  Phone: [phone]  Fax: [phone]  Follow Up Time: 1 month

## 2020-02-05 NOTE — PROGRESS NOTE ADULT - ASSESSMENT
Assessment: 69M PMH HTN chronic back pain, no significant  hx, admitted with R pyelonephritis. Patient does not have any signs of mechanical obstruction as a cause of pyelonephritis.    Recommendations:  - F/U urine culture sensitivities  - Continue broad spectrum antibiotics  - Continue care per primary team  - No urologic surgery intervention at this time
Pyelonephritis and ureteritis with bacteremia, E Coli, and dehydration with high fever initially but resolving on abx and rehydration.
Ureteritis and Pyelonephritis with sepsis settling on antibiotics and iv fluids. Lactate negative. Patient fully responsive and compis mentis when seen at 8 pm, with a history of a brief period of delirium earlier today when the core temp by rectal probe reached 106 F. Fever resolved with Tylenol and the abx. Tachycardia settled to a comfortable 64 min-1.    Will need to remain on iv abx until the blood and urine culture results dictate otherwise.    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 About a year ago a BCC was removed from the left thigh.
68 yo M PMH HTN, prostatitis, chronic back pain (2/2 MSK issues), chronic lymphocytosis, skin SCC w/ resection/biopsy p/w fever + urinary discomfort for the last 3 day, found to meet sepsis criteria (106 F, tachy, wbc, u/a +) 2/2 pyelonephritis (CT abdomen showed right ureteritis/UTI concerning for acute pyelonephritis (no stones or obstruction seen)) 2/2 UTI (U/A=+protein, +nitrite, +leuk esterase, large blood, >10 wbc), now on Ceftriaxone and stable for transfer to the [facility].

## 2020-02-05 NOTE — DISCHARGE NOTE PROVIDER - NSDCMRMEDTOKEN_GEN_ALL_CORE_FT
carvedilol 12.5 mg oral tablet: 1 tab(s) orally once a day  NIFEdipine extended release: 20 milligram(s) orally once a day (at bedtime) acetaminophen 325 mg oral tablet: 2 tab(s) orally every 4 hours, As needed, Temp greater or equal to 38.5C (101.3F), Mild Pain (1 - 3)  Bactrim  mg-160 mg oral tablet: 1 tab(s) orally 2 times a day   carvedilol 12.5 mg oral tablet: 1 tab(s) orally once a day  NIFEdipine extended release: 20 milligram(s) orally once a day (at bedtime)

## 2020-02-05 NOTE — PROGRESS NOTE ADULT - SUBJECTIVE AND OBJECTIVE BOX
SUBJECTIVE: Tolerating diet; dysuria improving      OBJECTIVE:    Vital Signs:  Vital Signs Last 24 Hrs  T(C): 36.9 (2020 10:01), Max: 37.4 (2020 17:20)  T(F): 98.5 (2020 10:01), Max: 99.4 (2020 17:20)  HR: 72 (2020 09:01) (72 - 82)  BP: 149/88 (2020 09:01) (130/83 - 149/88)  BP(mean): 105 (2020 00:09) (98 - 105)  RR: 16 (2020 09:01) (16 - 18)  SpO2: 97% (2020 09:01) (96% - 98%)    Physical Exam:  General: NAD  Pulmonary: Nonlabored breathing, no respiratory distress  Abdominal: No suprapubic tenderness  : No CVA tenderness, no Son catheter    Ins and Outs:  I&O's Summary    2020 07:01  -  2020 07:00  --------------------------------------------------------  IN: 550 mL / OUT: 4100 mL / NET: -3550 mL        Labs:                        12.4   12.58 )-----------( 266      ( 2020 07:18 )             38.9     02-05    142  |  108  |  11  ----------------------------<  114<H>  4.0   |  23  |  0.97    Ca    8.6      2020 07:18  Phos  2.1     02-04  Mg     2.3     02-05    TPro  7.3  /  Alb  3.9  /  TBili  0.9  /  DBili  x   /  AST  27  /  ALT  46<H>  /  AlkPhos  77  02-03      Urinalysis Basic - ( 2020 15:23 )    Color: Yellow / Appearance: Clear / S.015 / pH: x  Gluc: x / Ketone: NEGATIVE  / Bili: Negative / Urobili: 0.2 E.U./dL   Blood: x / Protein: 30 mg/dL / Nitrite: POSITIVE   Leuk Esterase: Small / RBC: > 10 /HPF / WBC > 10 /HPF   Sq Epi: x / Non Sq Epi: 0-5 /HPF / Bacteria: Present /HPF      CAPILLARY BLOOD GLUCOSE      POCT Blood Glucose.: 158 mg/dL (2020 22:11)  POCT Blood Glucose.: 128 mg/dL (2020 16:54)  POCT Blood Glucose.: 127 mg/dL (2020 11:38)    LIVER FUNCTIONS - ( 2020 14:10 )  Alb: 3.9 g/dL / Pro: 7.3 g/dL / ALK PHOS: 77 U/L / ALT: 46 U/L / AST: 27 U/L / GGT: x             RP ULTRASOUND  Findings:    RIGHT KIDNEY:  Length: 12.0 cm  Lesions: None  Hydronephrosis: None  Stones: None  Echogenicity: Normal    LEFT KIDNEY:  Length: 12.8 cm  Lesions: 1.2 cm left midregion simple cyst.  Hydronephrosis: None  Stones: None  Echogenicity: Normal    URINARY BLADDER:  Ureteral jets: Both visible.  Filling defects: None  Bladder volume: Pre-void: 95 ml; Post-void: 16.5 ml.    PROSTATE:  Prostate size measures 3.4 x 3.0 x 5.1 cm with calculated volume of 27.1 ml.       IMPRESSION:  No hydronephrosis. No focal abnormality in the right kidney or partially visualized right ureter. Of note, ultrasound is limited in evaluating pyelonephritis.

## 2020-02-05 NOTE — DISCHARGE NOTE PROVIDER - NSDCCPCAREPLAN_GEN_ALL_CORE_FT
PRINCIPAL DISCHARGE DIAGNOSIS  Diagnosis: Pyelonephritis  Assessment and Plan of Treatment: You were noted to have a urinary tract infection (UTI) causing pyelonephritis during your admission to [facility]. This was found based on your symptom profile (urinary frequency and/or pain on urination) as well as your urine testing positive for E. coli. You were received antibiotics throughout your hospital course. Please follow-up with your primary care physician. Please continue to take Bactrim DS one tablet twice a day for 12 more days (last dose to be taken on 2/16/20). Should you notice any symptoms such as but not limited to: unrelenting/severe fever (temperatuer >103 F and/or lasting >3 days), worsening pain on urination, urinary discharge, increased urinary frequency, chills, severe flank/lower back pain, or bloody urine, please return to the emergency department for interval evaluation.      SECONDARY DISCHARGE DIAGNOSES  Diagnosis: Bacteremia  Assessment and Plan of Treatment: You were diagnosed with E. coli bacteremia. There was bacteria growing in your blood that was most likely caused by your UTI and kidney infection. You were received antibiotics throughout your hospital course. Please follow-up with your primary care physician. Please continue to take Bactrim DS one tablet twice a day for 12 more days (last dose to be taken on 2/16/20). Should you notice any symptoms such as but not limited to: unrelenting/severe fever (temperatuer >103 F and/or lasting >3 days), worsening pain on urination, urinary discharge, increased urinary frequency, chills, severe flank/lower back pain, or bloody urine, please return to the emergency department for interval evaluation.    Diagnosis: Sepsis  Assessment and Plan of Treatment: You were admitted to the hospital with sepsis, which is a condition marked by inflammation due to infection. You were placed on antibiotics to control your infection and were monitored in the hospital for signs of worsened infection. If you notice any fevers, shortness of breath, chest pain, or severe weakness, please return to the emergency department.

## 2020-02-06 LAB — METHOD TYPE: SIGNIFICANT CHANGE UP

## 2020-02-09 LAB
CULTURE RESULTS: SIGNIFICANT CHANGE UP
ORGANISM # SPEC MICROSCOPIC CNT: SIGNIFICANT CHANGE UP
SPECIMEN SOURCE: SIGNIFICANT CHANGE UP

## 2020-09-09 ENCOUNTER — RESULT REVIEW (OUTPATIENT)
Age: 70
End: 2020-09-09

## 2020-09-09 PROCEDURE — 88305 TISSUE EXAM BY PATHOLOGIST: CPT | Mod: 26

## 2020-09-10 ENCOUNTER — OUTPATIENT (OUTPATIENT)
Dept: OUTPATIENT SERVICES | Facility: HOSPITAL | Age: 70
LOS: 1 days | End: 2020-09-10
Payer: MEDICARE

## 2020-09-10 DIAGNOSIS — D23.70 OTHER BENIGN NEOPLASM OF SKIN OF UNSPECIFIED LOWER LIMB, INCLUDING HIP: ICD-10-CM

## 2020-09-10 PROBLEM — D72.820 LYMPHOCYTOSIS (SYMPTOMATIC): Chronic | Status: ACTIVE | Noted: 2020-02-03

## 2020-09-10 PROBLEM — I10 ESSENTIAL (PRIMARY) HYPERTENSION: Chronic | Status: ACTIVE | Noted: 2020-02-03

## 2020-09-10 PROBLEM — C44.92 SQUAMOUS CELL CARCINOMA OF SKIN, UNSPECIFIED: Chronic | Status: ACTIVE | Noted: 2020-02-03

## 2020-09-10 PROBLEM — M54.9 DORSALGIA, UNSPECIFIED: Chronic | Status: ACTIVE | Noted: 2020-02-03

## 2020-09-10 PROCEDURE — 88305 TISSUE EXAM BY PATHOLOGIST: CPT

## 2020-09-15 LAB — SURGICAL PATHOLOGY STUDY: SIGNIFICANT CHANGE UP

## 2020-09-22 ENCOUNTER — NON-APPOINTMENT (OUTPATIENT)
Age: 70
End: 2020-09-22

## 2020-09-22 DIAGNOSIS — R93.41 ABNORMAL RADIOLOGIC FINDINGS ON DIAGNOSTIC IMAGING OF OF RENAL PELVIS, URETER, OR BLADDER: ICD-10-CM

## 2020-09-22 PROBLEM — Z00.00 ENCOUNTER FOR PREVENTIVE HEALTH EXAMINATION: Status: ACTIVE | Noted: 2020-09-22

## 2020-09-30 ENCOUNTER — APPOINTMENT (OUTPATIENT)
Dept: CT IMAGING | Facility: IMAGING CENTER | Age: 70
End: 2020-09-30
Payer: MEDICARE

## 2020-09-30 ENCOUNTER — OUTPATIENT (OUTPATIENT)
Dept: OUTPATIENT SERVICES | Facility: HOSPITAL | Age: 70
LOS: 1 days | End: 2020-09-30
Payer: MEDICARE

## 2020-09-30 ENCOUNTER — RESULT REVIEW (OUTPATIENT)
Age: 70
End: 2020-09-30

## 2020-09-30 DIAGNOSIS — R93.41 ABNORMAL RADIOLOGIC FINDINGS ON DIAGNOSTIC IMAGING OF RENAL PELVIS, URETER, OR BLADDER: ICD-10-CM

## 2020-09-30 PROCEDURE — 74178 CT ABD&PLV WO CNTR FLWD CNTR: CPT

## 2020-09-30 PROCEDURE — 82565 ASSAY OF CREATININE: CPT

## 2020-09-30 PROCEDURE — 74178 CT ABD&PLV WO CNTR FLWD CNTR: CPT | Mod: 26

## 2020-10-02 ENCOUNTER — TRANSCRIPTION ENCOUNTER (OUTPATIENT)
Age: 70
End: 2020-10-02

## 2021-03-17 ENCOUNTER — APPOINTMENT (OUTPATIENT)
Dept: MRI IMAGING | Facility: CLINIC | Age: 71
End: 2021-03-17
Payer: MEDICARE

## 2021-03-17 PROCEDURE — 73221 MRI JOINT UPR EXTREM W/O DYE: CPT | Mod: LT,MH

## 2021-10-15 ENCOUNTER — APPOINTMENT (OUTPATIENT)
Dept: UROLOGY | Facility: CLINIC | Age: 71
End: 2021-10-15
Payer: MEDICARE

## 2021-10-15 ENCOUNTER — NON-APPOINTMENT (OUTPATIENT)
Age: 71
End: 2021-10-15

## 2021-10-15 VITALS
WEIGHT: 155 LBS | BODY MASS INDEX: 23.49 KG/M2 | HEIGHT: 68 IN | SYSTOLIC BLOOD PRESSURE: 144 MMHG | TEMPERATURE: 97.3 F | HEART RATE: 66 BPM | DIASTOLIC BLOOD PRESSURE: 82 MMHG

## 2021-10-15 PROCEDURE — 99214 OFFICE O/P EST MOD 30 MIN: CPT

## 2021-10-15 NOTE — ASSESSMENT
[FreeTextEntry1] : Question of ureteral thickening in past\par Resolved on follow up CT\par \par No significant LUTS\par \par Annual UA, cytology

## 2021-10-15 NOTE — HISTORY OF PRESENT ILLNESS
[FreeTextEntry1] : CC: history of UTI, ? ureteritis vs. lesion\par \par 2/2020 ? ureteral thickening\par Doing well\par No urinary symptoms at all other than occasional nocturia. 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

## 2024-04-24 ENCOUNTER — PREPPED CHART (OUTPATIENT)
Dept: URBAN - METROPOLITAN AREA CLINIC 34 | Facility: CLINIC | Age: 74
End: 2024-04-24

## 2025-04-02 NOTE — PATIENT PROFILE ADULT - NSASFUNCLEVELADLAMBULATE_GEN_A_NUR
----- Message from Denny Larson MD sent at 4/2/2025  7:40 AM CDT -----  Please notify the patient of normal results.  Follow-up as planned.   2 = assistive person